# Patient Record
Sex: MALE | Race: WHITE | NOT HISPANIC OR LATINO | Employment: FULL TIME | URBAN - METROPOLITAN AREA
[De-identification: names, ages, dates, MRNs, and addresses within clinical notes are randomized per-mention and may not be internally consistent; named-entity substitution may affect disease eponyms.]

---

## 2023-11-09 ENCOUNTER — OFFICE VISIT (OUTPATIENT)
Dept: ORTHOPEDIC SURGERY | Facility: HOSPITAL | Age: 43
End: 2023-11-09
Payer: COMMERCIAL

## 2023-11-09 DIAGNOSIS — M75.41 IMPINGEMENT SYNDROME OF RIGHT SHOULDER: ICD-10-CM

## 2023-11-09 DIAGNOSIS — M75.21 BICIPITAL TENDINITIS OF RIGHT SHOULDER: Primary | ICD-10-CM

## 2023-11-09 DIAGNOSIS — M75.22 BICIPITAL TENDINITIS OF LEFT SHOULDER: ICD-10-CM

## 2023-11-09 PROCEDURE — 99213 OFFICE O/P EST LOW 20 MIN: CPT | Performed by: ORTHOPAEDIC SURGERY

## 2023-11-09 ASSESSMENT — ENCOUNTER SYMPTOMS
TROUBLE SWALLOWING: 0
CHILLS: 0
SHORTNESS OF BREATH: 0
RHINORRHEA: 0
WHEEZING: 0
WOUND: 0
FEVER: 0
FATIGUE: 0

## 2023-11-09 ASSESSMENT — PAIN SCALES - GENERAL: PAINLEVEL_OUTOF10: 4

## 2023-11-09 ASSESSMENT — PAIN - FUNCTIONAL ASSESSMENT: PAIN_FUNCTIONAL_ASSESSMENT: 0-10

## 2023-11-09 NOTE — PROGRESS NOTES
Reason for Appointment  B/l shoulder pain     History of Present Illness  Patient is a 43 y.o. male here today for a [facility] [place] case follow-up evaluation of recurrent bilateral shoulder pain.  He has recurrent bilateral shoulder pain.  He does a very good job as a heavy .  Over the last few months he has slowly had increased shoulder pain right greater than left.  Review his injections do give him relief for almost a year.  He would like to avoid any further intervention in terms of surgery and has done well with previous injections.  He has lateral and anterior right shoulder pain and anterior left shoulder pain.  No other changes in his past medical history, allergies, or medications.    History reviewed. No pertinent past medical history.    History reviewed. No pertinent surgical history.    Medication Documentation Review Audit       Reviewed by Kalpana Macias MA (Medical Assistant) on 11/09/23 at 0923      Medication Order Taking? Sig Documenting Provider Last Dose Status            No Medications to Display                                   No Known Allergies    Review of Systems   Constitutional:  Negative for chills, fatigue and fever.   HENT:  Negative for rhinorrhea and trouble swallowing.    Respiratory:  Negative for shortness of breath and wheezing.    Cardiovascular:  Negative for chest pain and leg swelling.   Skin:  Negative for rash and wound.     Exam   On exam right shoulder only shows about 110 degrees of active forward flexion, he has 130 degrees of active forward flexion on the left.  Maintained cuff strength bilaterally, markedly positive impingement signs on the right.  Tenderness anteriorly over bilateral biceps tendon sheaths and pain with open palm resistance tests.  Deltoids are functional bilaterally.  Good pulses and sensation in the upper extremities.    Assessment   Bilateral biceps tendinitis  Right shoulder impingement    Plan   We will place a C9 for bilateral biceps  tendon sheath injections and a right shoulder subacromial injection.  Have given him good relief for almost a year previously and we would like to avoid any further intervention such as surgery with the shoulders.    Written by Merline Weaver PA-C

## 2023-11-30 ENCOUNTER — OFFICE VISIT (OUTPATIENT)
Dept: ORTHOPEDIC SURGERY | Facility: HOSPITAL | Age: 43
End: 2023-11-30
Payer: COMMERCIAL

## 2023-11-30 DIAGNOSIS — M75.41 IMPINGEMENT SYNDROME OF RIGHT SHOULDER: ICD-10-CM

## 2023-11-30 DIAGNOSIS — M75.21 BICIPITAL TENDINITIS OF RIGHT SHOULDER: Primary | ICD-10-CM

## 2023-11-30 DIAGNOSIS — M75.22 BICIPITAL TENDINITIS OF LEFT SHOULDER: ICD-10-CM

## 2023-11-30 PROCEDURE — 20550 NJX 1 TENDON SHEATH/LIGAMENT: CPT | Performed by: ORTHOPAEDIC SURGERY

## 2023-11-30 PROCEDURE — 20611 DRAIN/INJ JOINT/BURSA W/US: CPT | Performed by: ORTHOPAEDIC SURGERY

## 2023-11-30 PROCEDURE — 76942 ECHO GUIDE FOR BIOPSY: CPT | Performed by: ORTHOPAEDIC SURGERY

## 2023-11-30 RX ORDER — TRIAMCINOLONE ACET/0.9%NACL/PF 40 MG/ML
40 VIAL (ML) INJECTION ONCE
Status: SHIPPED | OUTPATIENT
Start: 2023-11-30

## 2023-11-30 RX ORDER — METHYLPREDNISOLONE ACETATE 40 MG/ML
80 INJECTION, SUSPENSION INTRA-ARTICULAR; INTRALESIONAL; INTRAMUSCULAR; SOFT TISSUE ONCE
Status: SHIPPED | OUTPATIENT
Start: 2023-11-30

## 2023-11-30 NOTE — PROGRESS NOTES
Reason for Appointment  Chief Complaint   Patient presents with    Right Shoulder - Injections    Left Shoulder - Injections     History of Present Illness  This is a [facility] approved injection for bilateral biceps tendonitis and right shoulder bursitis. At this point, the patient is experiencing bilateral shoulder pain that is consistent with bilateral biceps tendonitis and right shoulder bursitis on clinical exam. We will do one cortisone injection to the bilateral biceps tendon sheaths and right subacromial space in hopes to calm their symptoms nicely. Pt understands the small risk of infection and warning signs including flare reaction. Follow-up in 4 weeks.     Assessment   Encounter Diagnoses   Name Primary?    Bicipital tendinitis of right shoulder Yes    Bicipital tendinitis of left shoulder     Impingement syndrome of right shoulder      Procedures  After discussing the risks and benefits of the procedure with proceeded with an injection.  Using ultrasound guidance we identified the acromion, humeral head and the subacromial bursa, images obtained. We then sterilely injected the right subacrominal space with a mixture of 40 mg of Kenalog and 1 cc of 1 % lidocaine. Pt tolerated the procedure well without any adverse reactions.  After discussing the risks and benefits of the procedure with proceeded with an injection. Using ultrasound guidance we identified the greater and lesser tuberosities and the biceps tendons sheath, images saved. We then sterilely injected the bilateral biceps tendon sheath with a mixture of 30 mg of Depo-Medrol and 1 cc of 1 % lidocaine. Pt tolerated the procedure well without any adverse reactions    Natalie DOMINGUEZ, attest that this documentation has been prepared under the direction and in the presence of Feliciano Echeverria MD. By signing below, Feliciano DOMINGUEZ MD, personally performed the services described in this documentation. All medical record entries made by the scribe were at  my direction and in my presence. I have reviewed the chart and agree that the record reflects my personal performance and is accurate and complete. 11/30/23

## 2024-01-11 ENCOUNTER — OFFICE VISIT (OUTPATIENT)
Dept: ORTHOPEDIC SURGERY | Facility: HOSPITAL | Age: 44
End: 2024-01-11
Payer: COMMERCIAL

## 2024-01-11 DIAGNOSIS — M75.41 IMPINGEMENT SYNDROME OF RIGHT SHOULDER: Primary | ICD-10-CM

## 2024-01-11 DIAGNOSIS — M75.21 BICIPITAL TENDINITIS OF RIGHT SHOULDER: ICD-10-CM

## 2024-01-11 PROCEDURE — 99213 OFFICE O/P EST LOW 20 MIN: CPT | Performed by: ORTHOPAEDIC SURGERY

## 2024-01-11 ASSESSMENT — ENCOUNTER SYMPTOMS
FATIGUE: 0
CHILLS: 0
WHEEZING: 0
FEVER: 0
COLOR CHANGE: 0
TROUBLE SWALLOWING: 0
SHORTNESS OF BREATH: 0

## 2024-01-11 ASSESSMENT — PAIN SCALES - GENERAL: PAINLEVEL_OUTOF10: 5 - MODERATE PAIN

## 2024-01-11 ASSESSMENT — PAIN - FUNCTIONAL ASSESSMENT: PAIN_FUNCTIONAL_ASSESSMENT: 0-10

## 2024-01-11 NOTE — PROGRESS NOTES
Reason for Appointment  Continued R shoulder pain    History of Present Illness  Patient is a 43 y.o. male here today for a [facility] case follow-up evaluation of continued right shoulder pain.  He had bilateral biceps and a right shoulder subacromial injection done 6 weeks ago.  The left shoulder is significantly better but he continues to have severe right shoulder pain.  He has had multiple sets of injections in the past and they have always helped the shoulders.  Now he has continued anterior shoulder pain, worse with lifting and overhead activity, he is losing motion in the shoulder and does a very active job.  No other changes in his past medical history, allergies, or medications.    History reviewed. No pertinent past medical history.    History reviewed. No pertinent surgical history.    Medication Documentation Review Audit       Reviewed by Kalpana Macias MA (Medical Assistant) on 01/11/24 at 0942      Medication Order Taking? Sig Documenting Provider Last Dose Status   methylPREDNISolone acetate (DEPO-Medrol) injection 80 mg [id number]   Feliciano Echeverria MD  Active   triamcinol ac (PF) in 0.9%NaCl (KENALOG) injection 40 mg [id number]   Feliciano Echeverria MD  Active                    No Known Allergies    Review of Systems   Constitutional:  Negative for chills, fatigue and fever.   HENT:  Negative for mouth sores and trouble swallowing.    Respiratory:  Negative for shortness of breath and wheezing.    Cardiovascular:  Negative for chest pain and leg swelling.   Skin:  Negative for color change and pallor.     Exam   On exam the right shoulder is lacking about 30 degrees of full active forward flexion compared to the left side.  He has some mild weakness with resisted external rotation on the right compared to the left side.  Deltoids are functional bilaterally, tenderness anteriorly over the right biceps tendon sheath and pain with an open palm resistance test.  Mildly positive impingement signs on the right.   Good pulses and sensation in the upper extremities.    Assessment   Right shoulder impingement  Right biceps tendinitis    Plan   With this failure of improvement after injections, an MRI of the right shoulder is warranted to evaluate for any progression of injury such as a rotator cuff tear or biceps tear.  He has had multiple sets of injections and has not had a recent MRI.  We will place a C9 for a right shoulder MRI and follow-up with him to discuss further options.    Written by Merline Weaver PA-C

## 2024-01-19 ENCOUNTER — TELEPHONE (OUTPATIENT)
Dept: ORTHOPEDIC SURGERY | Facility: HOSPITAL | Age: 44
End: 2024-01-19
Payer: COMMERCIAL

## 2024-01-19 DIAGNOSIS — M75.21 BICIPITAL TENDINITIS OF RIGHT SHOULDER: ICD-10-CM

## 2024-01-19 DIAGNOSIS — M75.41 IMPINGEMENT SYNDROME OF RIGHT SHOULDER: ICD-10-CM

## 2024-01-19 DIAGNOSIS — S43.431A TEAR OF RIGHT GLENOID LABRUM, INITIAL ENCOUNTER: ICD-10-CM

## 2024-01-23 ENCOUNTER — HOSPITAL ENCOUNTER (OUTPATIENT)
Dept: RADIOLOGY | Facility: HOSPITAL | Age: 44
Discharge: HOME | End: 2024-01-23
Payer: COMMERCIAL

## 2024-01-23 DIAGNOSIS — M75.21 BICIPITAL TENDINITIS OF RIGHT SHOULDER: ICD-10-CM

## 2024-01-23 DIAGNOSIS — S43.431A TEAR OF RIGHT GLENOID LABRUM, INITIAL ENCOUNTER: ICD-10-CM

## 2024-01-23 DIAGNOSIS — M75.41 IMPINGEMENT SYNDROME OF RIGHT SHOULDER: ICD-10-CM

## 2024-01-23 PROCEDURE — 73221 MRI JOINT UPR EXTREM W/O DYE: CPT | Mod: RT

## 2024-02-01 ENCOUNTER — OFFICE VISIT (OUTPATIENT)
Dept: ORTHOPEDIC SURGERY | Facility: HOSPITAL | Age: 44
End: 2024-02-01
Payer: COMMERCIAL

## 2024-02-01 DIAGNOSIS — M75.21 BICIPITAL TENDINITIS OF RIGHT SHOULDER: ICD-10-CM

## 2024-02-01 DIAGNOSIS — M75.41 IMPINGEMENT SYNDROME OF RIGHT SHOULDER: Primary | ICD-10-CM

## 2024-02-01 PROCEDURE — 99213 OFFICE O/P EST LOW 20 MIN: CPT | Performed by: ORTHOPAEDIC SURGERY

## 2024-02-01 ASSESSMENT — ENCOUNTER SYMPTOMS
TROUBLE SWALLOWING: 0
CHILLS: 0
FATIGUE: 0
SHORTNESS OF BREATH: 0
WHEEZING: 0
WOUND: 0
FEVER: 0

## 2024-02-01 ASSESSMENT — PAIN SCALES - GENERAL: PAINLEVEL_OUTOF10: 5 - MODERATE PAIN

## 2024-02-01 ASSESSMENT — PAIN - FUNCTIONAL ASSESSMENT: PAIN_FUNCTIONAL_ASSESSMENT: 0-10

## 2024-02-01 NOTE — PROGRESS NOTES
Reason for Appointment  Continued R shoulder pain    History of Present Illness  Patient is a 43 y.o. male here today for a [facility] case follow-up evaluation of continued severe right shoulder pain.  He has had multiple sets of injections that have given him good relief in the past, most recent set of injections helped him somewhat with lateral sided pain but he continues to have severe anterior shoulder pain worse with any lifting and activity.  He does do a very physical job.  Recent MRI is reviewed with the report and shows moderate grade partial-thickness tearing of the rotator cuff with tendinosis and biceps tendinosis.  No other changes in his past medical history, allergies, or medications.    History reviewed. No pertinent past medical history.    History reviewed. No pertinent surgical history.    Medication Documentation Review Audit       Reviewed by Merline Weaver PA-C (Physician Assistant) on 02/01/24 at 0901      Medication Order Taking? Sig Documenting Provider Last Dose Status   methylPREDNISolone acetate (DEPO-Medrol) injection 80 mg [id number]   Feliciano Echeverria MD  Active   triamcinol ac (PF) in 0.9%NaCl (KENALOG) injection 40 mg [id number]   Feliciano Echeverria MD  Active                    No Known Allergies    Review of Systems   Constitutional:  Negative for chills, fatigue and fever.   HENT:  Negative for postnasal drip and trouble swallowing.    Respiratory:  Negative for shortness of breath and wheezing.    Cardiovascular:  Negative for chest pain and leg swelling.   Skin:  Negative for rash and wound.     Exam   On exam he has pain with only active forward flexion up to about 100 degrees, passively I am able to get him up higher but with significant anterior shoulder pain.  Pain with an open palm resistance test and severe tenderness anteriorly over the biceps tendon sheath.  Positive impingement signs on the right but fairly good cuff strength with resisted external rotation.  Good pulses and  sensation in the upper extremity.    Assessment   Right shoulder impingement  Right biceps tendinitis    Plan   We had a long discussion with him today, he has had multiple sets of injections in the past that did give him good relief but he is now having severe symptoms that did not improve with injections.  Most of his symptoms are stemming from the biceps tendon sheath but he is having continued impingement symptoms as well.  He understands the risks of surge including nerve, artery, tendon damage, infection, continued pain, need for future surgery.  We will place a C9 for right shoulder arthroscopic decompression and biceps tenodesis    Written by Merline Weaver PA-C

## 2024-02-07 ENCOUNTER — TELEPHONE (OUTPATIENT)
Dept: ORTHOPEDIC SURGERY | Facility: CLINIC | Age: 44
End: 2024-02-07
Payer: COMMERCIAL

## 2024-02-07 NOTE — TELEPHONE ENCOUNTER
Patient has been approved for surgery. He was just seen last week 02/01/24. Does he need to come in again or can he just schedule surgery with Alanna?

## 2024-02-08 ENCOUNTER — PREP FOR PROCEDURE (OUTPATIENT)
Dept: ORTHOPEDIC SURGERY | Facility: HOSPITAL | Age: 44
End: 2024-02-08
Payer: COMMERCIAL

## 2024-02-08 DIAGNOSIS — M75.21 BICIPITAL TENDINITIS OF RIGHT SHOULDER: ICD-10-CM

## 2024-02-08 DIAGNOSIS — M75.41 IMPINGEMENT SYNDROME OF RIGHT SHOULDER: Primary | ICD-10-CM

## 2024-02-08 RX ORDER — SODIUM CHLORIDE, SODIUM LACTATE, POTASSIUM CHLORIDE, CALCIUM CHLORIDE 600; 310; 30; 20 MG/100ML; MG/100ML; MG/100ML; MG/100ML
100 INJECTION, SOLUTION INTRAVENOUS CONTINUOUS
Status: CANCELLED | OUTPATIENT
Start: 2024-02-08

## 2024-02-28 ENCOUNTER — APPOINTMENT (OUTPATIENT)
Dept: PREADMISSION TESTING | Facility: HOSPITAL | Age: 44
End: 2024-02-28
Payer: COMMERCIAL

## 2024-03-14 ENCOUNTER — APPOINTMENT (OUTPATIENT)
Dept: ORTHOPEDIC SURGERY | Facility: HOSPITAL | Age: 44
End: 2024-03-14
Payer: COMMERCIAL

## 2024-03-24 NOTE — H&P (VIEW-ONLY)
CPM/PAT Evaluation   Ashley Pedraza is a 44 y.o. male   Chief Complaint: I am having shoulder surgery    HPI:   Patient is a 43 y/o alert and oriented male coming in for PAT for a scheduled Repair Tendon w/ or w/o Tendon Graft Upper Extremity, Release Soft Tissue Upper Extremity on 4/3/24 w/ Dr Echeverria.    The patient reports he has 8/10 jolting shoulder pain with movement.  He states he is unable to raise his arm past 45 degrees.  He reports decreased rom.  He has intermittent numbness and tingling in his arm.  He has decreased hand strength.  Intermittent hand weakness.  He has had injections in the past but no physical therapy.  Patient denies chest pain, SOB, SERNA and NVDC.    Patient also denies Hx: DVT/PE.    Current medications were reviewed and a presurgical mediation schedule was provided.    He has no questions at this time.   Past Medical History:   Diagnosis Date    ADHD       Past Surgical History:   Procedure Laterality Date    NO PAST SURGERIES      [place] teeth extraction    No Known Allergies     Current Outpatient Medications on File Prior to Visit   Medication Sig Dispense Refill    amphetamine-dextroamphetamine (Adderall) 15 mg tablet Take 1 tablet (15 mg) by mouth every 12 hours.      naproxen sodium (Aleve) 220 mg tablet Take by mouth every 12 hours if needed.      tiZANidine (Zanaflex) 4 mg tablet Take 1 tablet (4 mg) by mouth as needed at bedtime.       Current Facility-Administered Medications on File Prior to Visit   Medication Dose Route Frequency Provider Last Rate Last Admin    methylPREDNISolone acetate (DEPO-Medrol) injection 80 mg  80 mg intra-articular Once Feliciano Echeverria MD        triamcinol ac (PF) in 0.9%NaCl (KENALOG) injection 40 mg  40 mg intra-articular Once Feliciano Echeverria MD           Review of Systems   Constitutional: Negative.    HENT: Negative.     Eyes: Negative.    Respiratory: Negative.     Cardiovascular: Negative.    Gastrointestinal: Negative.    Endocrine:  Negative.    Genitourinary: Negative.    Musculoskeletal:         See hpi for details   Allergic/Immunologic: Negative.    Neurological: Negative.    Hematological: Negative.    Psychiatric/Behavioral:          ADHD controlled with medication      Physical Exam  Vitals and nursing note reviewed.   Constitutional:       Appearance: Normal appearance.   HENT:      Head: Normocephalic and atraumatic.      Right Ear: Tympanic membrane normal.      Left Ear: Tympanic membrane normal.      Mouth/Throat:      Mouth: Mucous membranes are moist.      Pharynx: Oropharynx is clear.   Eyes:      Pupils: Pupils are equal, round, and reactive to light.   Cardiovascular:      Rate and Rhythm: Normal rate and regular rhythm.      Heart sounds: Normal heart sounds.   Pulmonary:      Effort: Pulmonary effort is normal.      Breath sounds: Normal breath sounds.   Abdominal:      General: Abdomen is flat. Bowel sounds are normal.      Palpations: Abdomen is soft.   Musculoskeletal:      Cervical back: Normal range of motion and neck supple.      Comments: Can only raise right arm to 45 degrees   Skin:     General: Skin is warm and dry.   Neurological:      General: No focal deficit present.      Mental Status: He is alert and oriented to person, place, and time.   Psychiatric:         Mood and Affect: Mood normal.         Behavior: Behavior normal.         Thought Content: Thought content normal.         Judgment: Judgment normal.        PAT AIRWAY:   Airway:     Mallampati::  II    TM distance::  >3 FB    Neck ROM::  Full  Has no dental issues  Ex smoker quit 2021 smoked 1/2 - 2 ppd x 28 years  Social alcohol use  No drug use - marijuana as a teenager  HAS NEVER HAD ANESTHESIA - just wisdom teeth  No family issues with anesthesia    Assessment and Plan:   Impingement Syndrome Right Shoulder, Biceps Tendinitis of Right Shoulder  Repair Tendon w/ or w/o Tendon Graft Upper Extremity, Release Soft Tissue Upper Extremity  Managed with  aleve 220mg     ADHD  Managed with Adderall 15mg daily    ASA II  RCRI - 0 points  Class I Risk 3.9%  DAHLIA - points Risk for FERNANDO   NSQIP - Predicted length of stay 0 days  ARISCAT - 3 points Low Risk 1.6%  DASI 36.7 Points 7.25 Mets  HARVINDER - 0.1%  JHFRAT - 3 points low  risk for falls  Clearance - not indicated  PAT Testing - CBC, BMP    Face to Face patient contact time 30 minutes    DEV Pryor-CNP 3/25/2024 9:37 AM  Results for orders placed or performed in visit on 03/25/24 (from the past 24 hour(s))   Basic Metabolic Panel   Result Value Ref Range    Glucose 88 74 - 99 mg/dL    Sodium 138 136 - 145 mmol/L    Potassium 3.7 3.5 - 5.3 mmol/L    Chloride 102 98 - 107 mmol/L    Bicarbonate 24 21 - 32 mmol/L    Anion Gap 16 10 - 20 mmol/L    Urea Nitrogen 26 (H) 6 - 23 mg/dL    Creatinine 1.46 (H) 0.50 - 1.30 mg/dL    eGFR 60 (L) >60 mL/min/1.73m*2    Calcium 9.5 8.6 - 10.3 mg/dL   CBC and Auto Differential   Result Value Ref Range    WBC 5.8 4.4 - 11.3 x10*3/uL    nRBC      RBC 4.97 4.50 - 5.90 x10*6/uL    Hemoglobin 11.5 (L) 13.5 - 17.5 g/dL    Hematocrit 35.9 (L) 41.0 - 52.0 %    MCV 72 (L) 80 - 100 fL    MCH 23.1 (L) 26.0 - 34.0 pg    MCHC 32.0 32.0 - 36.0 g/dL    RDW 14.9 (H) 11.5 - 14.5 %    Platelets 256 150 - 450 x10*3/uL    Neutrophils % 60.3 40.0 - 80.0 %    Immature Granulocytes %, Automated 0.2 0.0 - 0.9 %    Lymphocytes % 28.6 13.0 - 44.0 %    Monocytes % 9.4 2.0 - 10.0 %    Eosinophils % 1.2 0.0 - 6.0 %    Basophils % 0.3 0.0 - 2.0 %    Neutrophils Absolute 3.51 1.20 - 7.70 x10*3/uL    Immature Granulocytes Absolute, Automated 0.01 0.00 - 0.70 x10*3/uL    Lymphocytes Absolute 1.67 1.20 - 4.80 x10*3/uL    Monocytes Absolute 0.55 0.10 - 1.00 x10*3/uL    Eosinophils Absolute 0.07 0.00 - 0.70 x10*3/uL    Basophils Absolute 0.02 0.00 - 0.10 x10*3/uL

## 2024-03-24 NOTE — CPM/PAT H&P
CPM/PAT Evaluation   Ashley Pedraza is a 44 y.o. male   Chief Complaint: I am having shoulder surgery    HPI:   Patient is a 43 y/o alert and oriented male coming in for PAT for a scheduled Repair Tendon w/ or w/o Tendon Graft Upper Extremity, Release Soft Tissue Upper Extremity on 4/3/24 w/ Dr Echeverria.    The patient reports he has 8/10 jolting shoulder pain with movement.  He states he is unable to raise his arm past 45 degrees.  He reports decreased rom.  He has intermittent numbness and tingling in his arm.  He has decreased hand strength.  Intermittent hand weakness.  He has had injections in the past but no physical therapy.  Patient denies chest pain, SOB, SERNA and NVDC.    Patient also denies Hx: DVT/PE.    Current medications were reviewed and a presurgical mediation schedule was provided.    He has no questions at this time.   Past Medical History:   Diagnosis Date    ADHD       Past Surgical History:   Procedure Laterality Date    NO PAST SURGERIES      [place] teeth extraction    No Known Allergies     Current Outpatient Medications on File Prior to Visit   Medication Sig Dispense Refill    amphetamine-dextroamphetamine (Adderall) 15 mg tablet Take 1 tablet (15 mg) by mouth every 12 hours.      naproxen sodium (Aleve) 220 mg tablet Take by mouth every 12 hours if needed.      tiZANidine (Zanaflex) 4 mg tablet Take 1 tablet (4 mg) by mouth as needed at bedtime.       Current Facility-Administered Medications on File Prior to Visit   Medication Dose Route Frequency Provider Last Rate Last Admin    methylPREDNISolone acetate (DEPO-Medrol) injection 80 mg  80 mg intra-articular Once Feliciano Echeverria MD        triamcinol ac (PF) in 0.9%NaCl (KENALOG) injection 40 mg  40 mg intra-articular Once Feliciano Echeverria MD           Review of Systems   Constitutional: Negative.    HENT: Negative.     Eyes: Negative.    Respiratory: Negative.     Cardiovascular: Negative.    Gastrointestinal: Negative.    Endocrine:  Negative.    Genitourinary: Negative.    Musculoskeletal:         See hpi for details   Allergic/Immunologic: Negative.    Neurological: Negative.    Hematological: Negative.    Psychiatric/Behavioral:          ADHD controlled with medication      Physical Exam  Vitals and nursing note reviewed.   Constitutional:       Appearance: Normal appearance.   HENT:      Head: Normocephalic and atraumatic.      Right Ear: Tympanic membrane normal.      Left Ear: Tympanic membrane normal.      Mouth/Throat:      Mouth: Mucous membranes are moist.      Pharynx: Oropharynx is clear.   Eyes:      Pupils: Pupils are equal, round, and reactive to light.   Cardiovascular:      Rate and Rhythm: Normal rate and regular rhythm.      Heart sounds: Normal heart sounds.   Pulmonary:      Effort: Pulmonary effort is normal.      Breath sounds: Normal breath sounds.   Abdominal:      General: Abdomen is flat. Bowel sounds are normal.      Palpations: Abdomen is soft.   Musculoskeletal:      Cervical back: Normal range of motion and neck supple.      Comments: Can only raise right arm to 45 degrees   Skin:     General: Skin is warm and dry.   Neurological:      General: No focal deficit present.      Mental Status: He is alert and oriented to person, place, and time.   Psychiatric:         Mood and Affect: Mood normal.         Behavior: Behavior normal.         Thought Content: Thought content normal.         Judgment: Judgment normal.        PAT AIRWAY:   Airway:     Mallampati::  II    TM distance::  >3 FB    Neck ROM::  Full  Has no dental issues  Ex smoker quit 2021 smoked 1/2 - 2 ppd x 28 years  Social alcohol use  No drug use - marijuana as a teenager  HAS NEVER HAD ANESTHESIA - just wisdom teeth  No family issues with anesthesia    Assessment and Plan:   Impingement Syndrome Right Shoulder, Biceps Tendinitis of Right Shoulder  Repair Tendon w/ or w/o Tendon Graft Upper Extremity, Release Soft Tissue Upper Extremity  Managed with  aleve 220mg     ADHD  Managed with Adderall 15mg daily    ASA II  RCRI - 0 points  Class I Risk 3.9%  DAHLIA - points Risk for FERNANDO   NSQIP - Predicted length of stay 0 days  ARISCAT - 3 points Low Risk 1.6%  DASI 36.7 Points 7.25 Mets  HARVINDER - 0.1%  JHFRAT - 3 points low  risk for falls  Clearance - not indicated  PAT Testing - CBC, BMP    Face to Face patient contact time 30 minutes    DEV Pryor-CNP 3/25/2024 9:37 AM  Results for orders placed or performed in visit on 03/25/24 (from the past 24 hour(s))   Basic Metabolic Panel   Result Value Ref Range    Glucose 88 74 - 99 mg/dL    Sodium 138 136 - 145 mmol/L    Potassium 3.7 3.5 - 5.3 mmol/L    Chloride 102 98 - 107 mmol/L    Bicarbonate 24 21 - 32 mmol/L    Anion Gap 16 10 - 20 mmol/L    Urea Nitrogen 26 (H) 6 - 23 mg/dL    Creatinine 1.46 (H) 0.50 - 1.30 mg/dL    eGFR 60 (L) >60 mL/min/1.73m*2    Calcium 9.5 8.6 - 10.3 mg/dL   CBC and Auto Differential   Result Value Ref Range    WBC 5.8 4.4 - 11.3 x10*3/uL    nRBC      RBC 4.97 4.50 - 5.90 x10*6/uL    Hemoglobin 11.5 (L) 13.5 - 17.5 g/dL    Hematocrit 35.9 (L) 41.0 - 52.0 %    MCV 72 (L) 80 - 100 fL    MCH 23.1 (L) 26.0 - 34.0 pg    MCHC 32.0 32.0 - 36.0 g/dL    RDW 14.9 (H) 11.5 - 14.5 %    Platelets 256 150 - 450 x10*3/uL    Neutrophils % 60.3 40.0 - 80.0 %    Immature Granulocytes %, Automated 0.2 0.0 - 0.9 %    Lymphocytes % 28.6 13.0 - 44.0 %    Monocytes % 9.4 2.0 - 10.0 %    Eosinophils % 1.2 0.0 - 6.0 %    Basophils % 0.3 0.0 - 2.0 %    Neutrophils Absolute 3.51 1.20 - 7.70 x10*3/uL    Immature Granulocytes Absolute, Automated 0.01 0.00 - 0.70 x10*3/uL    Lymphocytes Absolute 1.67 1.20 - 4.80 x10*3/uL    Monocytes Absolute 0.55 0.10 - 1.00 x10*3/uL    Eosinophils Absolute 0.07 0.00 - 0.70 x10*3/uL    Basophils Absolute 0.02 0.00 - 0.10 x10*3/uL

## 2024-03-25 ENCOUNTER — APPOINTMENT (OUTPATIENT)
Dept: PREADMISSION TESTING | Facility: HOSPITAL | Age: 44
End: 2024-03-25

## 2024-03-25 ENCOUNTER — PRE-ADMISSION TESTING (OUTPATIENT)
Dept: PREADMISSION TESTING | Facility: HOSPITAL | Age: 44
End: 2024-03-25
Payer: COMMERCIAL

## 2024-03-25 VITALS
BODY MASS INDEX: 26.01 KG/M2 | WEIGHT: 175.6 LBS | OXYGEN SATURATION: 99 % | DIASTOLIC BLOOD PRESSURE: 86 MMHG | SYSTOLIC BLOOD PRESSURE: 135 MMHG | HEART RATE: 82 BPM | TEMPERATURE: 98.5 F | RESPIRATION RATE: 12 BRPM | HEIGHT: 69 IN

## 2024-03-25 DIAGNOSIS — Z01.818 PREOPERATIVE TESTING: Primary | ICD-10-CM

## 2024-03-25 DIAGNOSIS — M75.41 IMPINGEMENT SYNDROME OF RIGHT SHOULDER: ICD-10-CM

## 2024-03-25 DIAGNOSIS — M75.21 BICIPITAL TENDINITIS OF RIGHT SHOULDER: ICD-10-CM

## 2024-03-25 LAB
ANION GAP SERPL CALC-SCNC: 16 MMOL/L (ref 10–20)
BASOPHILS # BLD AUTO: 0.02 X10*3/UL (ref 0–0.1)
BASOPHILS NFR BLD AUTO: 0.3 %
BUN SERPL-MCNC: 26 MG/DL (ref 6–23)
CALCIUM SERPL-MCNC: 9.5 MG/DL (ref 8.6–10.3)
CHLORIDE SERPL-SCNC: 102 MMOL/L (ref 98–107)
CO2 SERPL-SCNC: 24 MMOL/L (ref 21–32)
CREAT SERPL-MCNC: 1.46 MG/DL (ref 0.5–1.3)
EGFRCR SERPLBLD CKD-EPI 2021: 60 ML/MIN/1.73M*2
EOSINOPHIL # BLD AUTO: 0.07 X10*3/UL (ref 0–0.7)
EOSINOPHIL NFR BLD AUTO: 1.2 %
ERYTHROCYTE [DISTWIDTH] IN BLOOD BY AUTOMATED COUNT: 14.9 % (ref 11.5–14.5)
GLUCOSE SERPL-MCNC: 88 MG/DL (ref 74–99)
HCT VFR BLD AUTO: 35.9 % (ref 41–52)
HGB BLD-MCNC: 11.5 G/DL (ref 13.5–17.5)
IMM GRANULOCYTES # BLD AUTO: 0.01 X10*3/UL (ref 0–0.7)
IMM GRANULOCYTES NFR BLD AUTO: 0.2 % (ref 0–0.9)
LYMPHOCYTES # BLD AUTO: 1.67 X10*3/UL (ref 1.2–4.8)
LYMPHOCYTES NFR BLD AUTO: 28.6 %
MCH RBC QN AUTO: 23.1 PG (ref 26–34)
MCHC RBC AUTO-ENTMCNC: 32 G/DL (ref 32–36)
MCV RBC AUTO: 72 FL (ref 80–100)
MONOCYTES # BLD AUTO: 0.55 X10*3/UL (ref 0.1–1)
MONOCYTES NFR BLD AUTO: 9.4 %
NEUTROPHILS # BLD AUTO: 3.51 X10*3/UL (ref 1.2–7.7)
NEUTROPHILS NFR BLD AUTO: 60.3 %
NRBC BLD-RTO: ABNORMAL /100{WBCS}
PLATELET # BLD AUTO: 256 X10*3/UL (ref 150–450)
POTASSIUM SERPL-SCNC: 3.7 MMOL/L (ref 3.5–5.3)
RBC # BLD AUTO: 4.97 X10*6/UL (ref 4.5–5.9)
SODIUM SERPL-SCNC: 138 MMOL/L (ref 136–145)
WBC # BLD AUTO: 5.8 X10*3/UL (ref 4.4–11.3)

## 2024-03-25 PROCEDURE — 36415 COLL VENOUS BLD VENIPUNCTURE: CPT

## 2024-03-25 PROCEDURE — 99203 OFFICE O/P NEW LOW 30 MIN: CPT | Performed by: NURSE PRACTITIONER

## 2024-03-25 PROCEDURE — 80048 BASIC METABOLIC PNL TOTAL CA: CPT

## 2024-03-25 PROCEDURE — 85025 COMPLETE CBC W/AUTO DIFF WBC: CPT

## 2024-03-25 RX ORDER — TIZANIDINE 4 MG/1
4 TABLET ORAL NIGHTLY PRN
COMMUNITY

## 2024-03-25 RX ORDER — NAPROXEN SODIUM 220 MG
TABLET ORAL EVERY 12 HOURS PRN
COMMUNITY

## 2024-03-25 RX ORDER — DEXTROAMPHETAMINE SACCHARATE, AMPHETAMINE ASPARTATE, DEXTROAMPHETAMINE SULFATE AND AMPHETAMINE SULFATE 3.75; 3.75; 3.75; 3.75 MG/1; MG/1; MG/1; MG/1
15 TABLET ORAL EVERY 12 HOURS
COMMUNITY
Start: 2023-10-06

## 2024-03-25 ASSESSMENT — ENCOUNTER SYMPTOMS
GASTROINTESTINAL NEGATIVE: 1
EYES NEGATIVE: 1
ALLERGIC/IMMUNOLOGIC NEGATIVE: 1
HEMATOLOGIC/LYMPHATIC NEGATIVE: 1
CONSTITUTIONAL NEGATIVE: 1
RESPIRATORY NEGATIVE: 1
CARDIOVASCULAR NEGATIVE: 1
NEUROLOGICAL NEGATIVE: 1
ENDOCRINE NEGATIVE: 1

## 2024-03-25 ASSESSMENT — PAIN SCALES - GENERAL: PAINLEVEL_OUTOF10: 8

## 2024-03-25 ASSESSMENT — PAIN DESCRIPTION - DESCRIPTORS: DESCRIPTORS: STABBING

## 2024-03-25 ASSESSMENT — PAIN - FUNCTIONAL ASSESSMENT: PAIN_FUNCTIONAL_ASSESSMENT: 0-10

## 2024-03-25 NOTE — PREPROCEDURE INSTRUCTIONS
Medication List            Accurate as of March 25, 2024  9:08 AM. Always use your most recent med list.                Aleve 220 mg tablet  Generic drug: naproxen sodium  Medication Adjustments for Surgery: Stop 7 days before surgery     amphetamine-dextroamphetamine 15 mg tablet  Commonly known as: Adderall  Medication Adjustments for Surgery: Stop 1 day before surgery     tiZANidine 4 mg tablet  Commonly known as: Zanaflex  Medication Adjustments for Surgery: Continue until night before surgery                              NPO Instructions:    Do not eat any food after midnight the night before your surgery/procedure.    Additional Instructions:     Seven/Six Days before Surgery:  Review your medication instructions, stop indicated medications  Five Days before Surgery:  Review your medication instructions, stop indicated medications  Three Days before Surgery:  Review your medication instructions, stop indicated medications  The Day before Surgery:  Review your medication instructions, stop indicated medications  You will be contacted regarding the time of your arrival to facility and surgery time  Do not eat any food after Midnight  Day of Surgery:  Review your medication instructions, take indicated medications  Wear  comfortable loose fitting clothing  Do not use moisturizers, creams, lotions or perfume  All jewelry and valuables should be left at home  PAT DISCHARGE INSTRUCTIONS    Please call the Same Day Surgery (SDS) Department of the hospital where your procedure will be performed between 2:00- 3:30 PM the day before your surgery. If you are scheduled on a Monday, or a Tuesday following a Monday holiday, you will need to call on the last business day prior to your surgery.    [facility]  [address] [place] [address]  [phone]    Please let your surgeon know if:      You develop any open sores, shingles, burning or painful urination as these may  increase your risk of an infection.   You no longer wish to have the surgery.   Any other personal circumstances change that may lead to the need to cancel or defer this surgery-such as being sick or getting admitted to any hospital within one week of your planned procedure.    Your contact details change, such as a change of address or phone number.    Starting now:     Please DO NOT drink alcohol or smoke for 24 hours before surgery. It is well known that quitting smoking can make a huge difference to your health and recovery from surgery. The longer you abstain from smoking, the better your chances of a healthy recovery. If you need help with quitting, call 1-800QUIT-NOW to be connected to a trained counselor who will discuss the best methods to help you quit.     Before your surgery:    Please stop all supplements 7 days prior to surgery. Or as directed by your surgeon.   Please stop taking NSAID pain medicine such as Advil and Motrin 7 days before surgery.    If you develop any fever, cough, cold, rashes, cuts, scratches, scrapes, urinary symptoms or infection anywhere on your body (including teeth and gums) prior to surgery, please call your surgeon’s office as soon as possible. This may require treatment to reduce the chance of cancellation on the day of surgery.    The day before your surgery:   DIET- Do not eat any food after MIDNIGHT.   Get a good night’s rest.  Use the special soap for bathing if you have been instructed to use one.    Scheduled surgery times may change and you will be notified if this occurs - please check your personal voicemail for any updates.     On the morning of surgery:   Wear comfortable, loose fitting clothes which open in the front. Please do not wear moisturizers, creams, lotions, makeup or perfume.    Please bring with you to surgery:   Photo ID and insurance card   Current list of medicines and allergies   Pacemaker/ Defibrillator/Heart stent cards   CPAP machine and mask     Slings/ splints/ crutches   A copy of your complete advanced directive/DHPOA.    Please do NOT bring with you to surgery:   All jewelry and valuables should be left at home.   Prosthetic devices such as contact lenses, hearing aids, dentures, eyelash extensions, hairpins and body piercings must be removed prior to going in to the surgical suite.    After outpatient surgery:   A responsible adult MUST accompany you at the time of discharge and stay with you for 24 hours after your surgery. You may NOT drive yourself home after surgery.    Do not drive, operate machinery, make critical decisions or do activities that require co-ordination or balance until after a night’s sleep.   Do not drink alcoholic beverages for 24 hours.   Instructions for resuming your medications will be provided by your surgeon.    CALL YOUR DOCTOR AFTER SURGERY IF YOU HAVE:     Chills and/or a fever of 101° F or higher.    Redness, swelling, pus or drainage from your surgical wound or a bad smell from the wound.    Lightheadedness, fainting or confusion.    Persistent vomiting (throwing up) and are not able to eat or drink for 12 hours.    Three or more loose, watery bowel movements in 24 hours (diarrhea).   Difficulty or pain while urinating( after non-urological surgery)    Pain and swelling in your legs, especially if it is only on one side.    Difficulty breathing or are breathing faster than normal.    Any new concerning symptoms.      Reviewed pre-op instructions with patient, states understanding and denies further questions at this time.    If you have not received a call regarding your arrival time for surgery by 2pm on the day before surgery, you can call [phone].    Take Care

## 2024-04-03 ENCOUNTER — ANESTHESIA EVENT (OUTPATIENT)
Dept: OPERATING ROOM | Facility: HOSPITAL | Age: 44
End: 2024-04-03
Payer: COMMERCIAL

## 2024-04-03 ENCOUNTER — HOSPITAL ENCOUNTER (OUTPATIENT)
Facility: HOSPITAL | Age: 44
Setting detail: OUTPATIENT SURGERY
Discharge: HOME | End: 2024-04-03
Attending: ORTHOPAEDIC SURGERY | Admitting: ORTHOPAEDIC SURGERY
Payer: COMMERCIAL

## 2024-04-03 ENCOUNTER — ANESTHESIA (OUTPATIENT)
Dept: OPERATING ROOM | Facility: HOSPITAL | Age: 44
End: 2024-04-03
Payer: COMMERCIAL

## 2024-04-03 VITALS
SYSTOLIC BLOOD PRESSURE: 128 MMHG | OXYGEN SATURATION: 97 % | WEIGHT: 175.6 LBS | RESPIRATION RATE: 18 BRPM | TEMPERATURE: 97.7 F | HEART RATE: 71 BPM | BODY MASS INDEX: 26.01 KG/M2 | HEIGHT: 69 IN | DIASTOLIC BLOOD PRESSURE: 86 MMHG

## 2024-04-03 DIAGNOSIS — M75.41 IMPINGEMENT SYNDROME OF RIGHT SHOULDER: Primary | ICD-10-CM

## 2024-04-03 DIAGNOSIS — M75.21 BICIPITAL TENDINITIS OF RIGHT SHOULDER: ICD-10-CM

## 2024-04-03 PROCEDURE — 2500000004 HC RX 250 GENERAL PHARMACY W/ HCPCS (ALT 636 FOR OP/ED): Performed by: ORTHOPAEDIC SURGERY

## 2024-04-03 PROCEDURE — 7100000002 HC RECOVERY ROOM TIME - EACH INCREMENTAL 1 MINUTE: Performed by: ORTHOPAEDIC SURGERY

## 2024-04-03 PROCEDURE — C1713 ANCHOR/SCREW BN/BN,TIS/BN: HCPCS | Performed by: ORTHOPAEDIC SURGERY

## 2024-04-03 PROCEDURE — 7100000009 HC PHASE TWO TIME - INITIAL BASE CHARGE: Performed by: ORTHOPAEDIC SURGERY

## 2024-04-03 PROCEDURE — A23430 PR REPAIR BICEPS LONG TENDON: Performed by: ANESTHESIOLOGY

## 2024-04-03 PROCEDURE — 29823 SHO ARTHRS SRG XTNSV DBRDMT: CPT | Performed by: PHYSICIAN ASSISTANT

## 2024-04-03 PROCEDURE — 23430 REPAIR BICEPS TENDON: CPT | Performed by: ORTHOPAEDIC SURGERY

## 2024-04-03 PROCEDURE — 2500000005 HC RX 250 GENERAL PHARMACY W/O HCPCS: Performed by: ANESTHESIOLOGY

## 2024-04-03 PROCEDURE — 64415 NJX AA&/STRD BRCH PLXS IMG: CPT | Performed by: ANESTHESIOLOGY

## 2024-04-03 PROCEDURE — 2780000003 HC OR 278 NO HCPCS: Performed by: ORTHOPAEDIC SURGERY

## 2024-04-03 PROCEDURE — 2500000005 HC RX 250 GENERAL PHARMACY W/O HCPCS: Performed by: ANESTHESIOLOGIST ASSISTANT

## 2024-04-03 PROCEDURE — 3700000001 HC GENERAL ANESTHESIA TIME - INITIAL BASE CHARGE: Performed by: ORTHOPAEDIC SURGERY

## 2024-04-03 PROCEDURE — 3600000008 HC OR TIME - EACH INCREMENTAL 1 MINUTE - PROCEDURE LEVEL THREE: Performed by: ORTHOPAEDIC SURGERY

## 2024-04-03 PROCEDURE — 3600000003 HC OR TIME - INITIAL BASE CHARGE - PROCEDURE LEVEL THREE: Performed by: ORTHOPAEDIC SURGERY

## 2024-04-03 PROCEDURE — 7100000010 HC PHASE TWO TIME - EACH INCREMENTAL 1 MINUTE: Performed by: ORTHOPAEDIC SURGERY

## 2024-04-03 PROCEDURE — 2500000004 HC RX 250 GENERAL PHARMACY W/ HCPCS (ALT 636 FOR OP/ED): Performed by: PHYSICIAN ASSISTANT

## 2024-04-03 PROCEDURE — 2720000007 HC OR 272 NO HCPCS: Performed by: ORTHOPAEDIC SURGERY

## 2024-04-03 PROCEDURE — A23430 PR REPAIR BICEPS LONG TENDON: Performed by: ANESTHESIOLOGIST ASSISTANT

## 2024-04-03 PROCEDURE — 29823 SHO ARTHRS SRG XTNSV DBRDMT: CPT | Performed by: ORTHOPAEDIC SURGERY

## 2024-04-03 PROCEDURE — 29826 SHO ARTHRS SRG DECOMPRESSION: CPT | Performed by: ORTHOPAEDIC SURGERY

## 2024-04-03 PROCEDURE — A4217 STERILE WATER/SALINE, 500 ML: HCPCS | Performed by: ORTHOPAEDIC SURGERY

## 2024-04-03 PROCEDURE — 7100000001 HC RECOVERY ROOM TIME - INITIAL BASE CHARGE: Performed by: ORTHOPAEDIC SURGERY

## 2024-04-03 PROCEDURE — 2500000004 HC RX 250 GENERAL PHARMACY W/ HCPCS (ALT 636 FOR OP/ED): Performed by: ANESTHESIOLOGY

## 2024-04-03 PROCEDURE — 3700000002 HC GENERAL ANESTHESIA TIME - EACH INCREMENTAL 1 MINUTE: Performed by: ORTHOPAEDIC SURGERY

## 2024-04-03 PROCEDURE — 23430 REPAIR BICEPS TENDON: CPT | Performed by: PHYSICIAN ASSISTANT

## 2024-04-03 PROCEDURE — 29826 SHO ARTHRS SRG DECOMPRESSION: CPT | Performed by: PHYSICIAN ASSISTANT

## 2024-04-03 PROCEDURE — 2500000004 HC RX 250 GENERAL PHARMACY W/ HCPCS (ALT 636 FOR OP/ED): Performed by: ANESTHESIOLOGIST ASSISTANT

## 2024-04-03 PROCEDURE — A4649 SURGICAL SUPPLIES: HCPCS | Performed by: ORTHOPAEDIC SURGERY

## 2024-04-03 DEVICE — PROXIMAL TENODESIS IMPLANT SYSTEM REV: 0
Type: IMPLANTABLE DEVICE | Site: ARM | Status: FUNCTIONAL
Brand: ARTHREX®

## 2024-04-03 RX ORDER — ONDANSETRON HYDROCHLORIDE 2 MG/ML
INJECTION, SOLUTION INTRAVENOUS AS NEEDED
Status: DISCONTINUED | OUTPATIENT
Start: 2024-04-03 | End: 2024-04-03

## 2024-04-03 RX ORDER — MIDAZOLAM HYDROCHLORIDE 1 MG/ML
2 INJECTION, SOLUTION INTRAMUSCULAR; INTRAVENOUS ONCE
Status: COMPLETED | OUTPATIENT
Start: 2024-04-03 | End: 2024-04-03

## 2024-04-03 RX ORDER — FENTANYL CITRATE 50 UG/ML
50 INJECTION, SOLUTION INTRAMUSCULAR; INTRAVENOUS EVERY 5 MIN PRN
Status: DISCONTINUED | OUTPATIENT
Start: 2024-04-03 | End: 2024-04-03 | Stop reason: HOSPADM

## 2024-04-03 RX ORDER — MEPERIDINE HYDROCHLORIDE 25 MG/ML
12.5 INJECTION INTRAMUSCULAR; INTRAVENOUS; SUBCUTANEOUS EVERY 10 MIN PRN
Status: DISCONTINUED | OUTPATIENT
Start: 2024-04-03 | End: 2024-04-03 | Stop reason: HOSPADM

## 2024-04-03 RX ORDER — FENTANYL CITRATE 50 UG/ML
100 INJECTION, SOLUTION INTRAMUSCULAR; INTRAVENOUS ONCE
Status: COMPLETED | OUTPATIENT
Start: 2024-04-03 | End: 2024-04-03

## 2024-04-03 RX ORDER — NEOSTIGMINE METHYLSULFATE 1 MG/ML
INJECTION, SOLUTION INTRAVENOUS AS NEEDED
Status: DISCONTINUED | OUTPATIENT
Start: 2024-04-03 | End: 2024-04-03

## 2024-04-03 RX ORDER — SODIUM CHLORIDE, SODIUM LACTATE, POTASSIUM CHLORIDE, CALCIUM CHLORIDE 600; 310; 30; 20 MG/100ML; MG/100ML; MG/100ML; MG/100ML
100 INJECTION, SOLUTION INTRAVENOUS CONTINUOUS
Status: DISCONTINUED | OUTPATIENT
Start: 2024-04-03 | End: 2024-04-03 | Stop reason: HOSPADM

## 2024-04-03 RX ORDER — DEXAMETHASONE SODIUM PHOSPHATE 10 MG/ML
INJECTION INTRAMUSCULAR; INTRAVENOUS AS NEEDED
Status: DISCONTINUED | OUTPATIENT
Start: 2024-04-03 | End: 2024-04-03

## 2024-04-03 RX ORDER — FENTANYL CITRATE 50 UG/ML
INJECTION, SOLUTION INTRAMUSCULAR; INTRAVENOUS AS NEEDED
Status: DISCONTINUED | OUTPATIENT
Start: 2024-04-03 | End: 2024-04-03

## 2024-04-03 RX ORDER — LABETALOL HYDROCHLORIDE 5 MG/ML
5 INJECTION, SOLUTION INTRAVENOUS ONCE AS NEEDED
Status: DISCONTINUED | OUTPATIENT
Start: 2024-04-03 | End: 2024-04-03 | Stop reason: HOSPADM

## 2024-04-03 RX ORDER — PROPOFOL 10 MG/ML
INJECTION, EMULSION INTRAVENOUS AS NEEDED
Status: DISCONTINUED | OUTPATIENT
Start: 2024-04-03 | End: 2024-04-03

## 2024-04-03 RX ORDER — BUPIVACAINE HYDROCHLORIDE 5 MG/ML
INJECTION, SOLUTION PERINEURAL AS NEEDED
Status: DISCONTINUED | OUTPATIENT
Start: 2024-04-03 | End: 2024-04-03

## 2024-04-03 RX ORDER — LIDOCAINE HYDROCHLORIDE 10 MG/ML
INJECTION, SOLUTION EPIDURAL; INFILTRATION; INTRACAUDAL; PERINEURAL AS NEEDED
Status: DISCONTINUED | OUTPATIENT
Start: 2024-04-03 | End: 2024-04-03

## 2024-04-03 RX ORDER — CEFAZOLIN SODIUM 2 G/100ML
2 INJECTION, SOLUTION INTRAVENOUS ONCE
Status: COMPLETED | OUTPATIENT
Start: 2024-04-03 | End: 2024-04-03

## 2024-04-03 RX ORDER — OXYCODONE AND ACETAMINOPHEN 5; 325 MG/1; MG/1
1 TABLET ORAL EVERY 6 HOURS PRN
Qty: 20 TABLET | Refills: 0 | Status: SHIPPED | OUTPATIENT
Start: 2024-04-03 | End: 2024-04-08

## 2024-04-03 RX ORDER — GLYCOPYRROLATE 0.2 MG/ML
INJECTION INTRAMUSCULAR; INTRAVENOUS AS NEEDED
Status: DISCONTINUED | OUTPATIENT
Start: 2024-04-03 | End: 2024-04-03

## 2024-04-03 RX ORDER — ALBUTEROL SULFATE 0.83 MG/ML
2.5 SOLUTION RESPIRATORY (INHALATION) ONCE AS NEEDED
Status: DISCONTINUED | OUTPATIENT
Start: 2024-04-03 | End: 2024-04-03 | Stop reason: HOSPADM

## 2024-04-03 RX ORDER — HYDRALAZINE HYDROCHLORIDE 20 MG/ML
5 INJECTION INTRAMUSCULAR; INTRAVENOUS EVERY 30 MIN PRN
Status: DISCONTINUED | OUTPATIENT
Start: 2024-04-03 | End: 2024-04-03 | Stop reason: HOSPADM

## 2024-04-03 RX ORDER — ROCURONIUM BROMIDE 10 MG/ML
INJECTION, SOLUTION INTRAVENOUS AS NEEDED
Status: DISCONTINUED | OUTPATIENT
Start: 2024-04-03 | End: 2024-04-03

## 2024-04-03 RX ORDER — ONDANSETRON HYDROCHLORIDE 2 MG/ML
4 INJECTION, SOLUTION INTRAVENOUS ONCE AS NEEDED
Status: DISCONTINUED | OUTPATIENT
Start: 2024-04-03 | End: 2024-04-03 | Stop reason: HOSPADM

## 2024-04-03 RX ADMIN — NEOSTIGMINE METHYLSULFATE 2 MG: 1 INJECTION INTRAVENOUS at 13:30

## 2024-04-03 RX ADMIN — FENTANYL CITRATE 50 MCG: 50 INJECTION INTRAMUSCULAR; INTRAVENOUS at 13:23

## 2024-04-03 RX ADMIN — PROPOFOL 200 MG: 10 INJECTION, EMULSION INTRAVENOUS at 12:38

## 2024-04-03 RX ADMIN — LIDOCAINE HYDROCHLORIDE 50 MG: 10 INJECTION, SOLUTION EPIDURAL; INFILTRATION; INTRACAUDAL; PERINEURAL at 12:38

## 2024-04-03 RX ADMIN — SODIUM CHLORIDE, SODIUM LACTATE, POTASSIUM CHLORIDE, AND CALCIUM CHLORIDE 100 ML/HR: 600; 310; 30; 20 INJECTION, SOLUTION INTRAVENOUS at 11:02

## 2024-04-03 RX ADMIN — BUPIVACAINE HYDROCHLORIDE 20 ML: 5 INJECTION, SOLUTION PERINEURAL at 11:33

## 2024-04-03 RX ADMIN — FENTANYL CITRATE 50 MCG: 50 INJECTION INTRAMUSCULAR; INTRAVENOUS at 12:38

## 2024-04-03 RX ADMIN — DEXAMETHASONE SODIUM PHOSPHATE 10 MG: 10 INJECTION, SOLUTION INTRAMUSCULAR; INTRAVENOUS at 11:33

## 2024-04-03 RX ADMIN — FENTANYL CITRATE 100 MCG: 50 INJECTION INTRAMUSCULAR; INTRAVENOUS at 11:30

## 2024-04-03 RX ADMIN — MIDAZOLAM HYDROCHLORIDE 2 MG: 1 INJECTION, SOLUTION INTRAMUSCULAR; INTRAVENOUS at 11:29

## 2024-04-03 RX ADMIN — CEFAZOLIN SODIUM 2 G: 2 INJECTION, SOLUTION INTRAVENOUS at 12:45

## 2024-04-03 RX ADMIN — ONDANSETRON 4 MG: 2 INJECTION INTRAMUSCULAR; INTRAVENOUS at 13:23

## 2024-04-03 RX ADMIN — GLYCOPYRROLATE 0.4 MG: 0.2 INJECTION INTRAMUSCULAR; INTRAVENOUS at 13:30

## 2024-04-03 RX ADMIN — SODIUM CHLORIDE, SODIUM LACTATE, POTASSIUM CHLORIDE, AND CALCIUM CHLORIDE: 600; 310; 30; 20 INJECTION, SOLUTION INTRAVENOUS at 13:38

## 2024-04-03 RX ADMIN — ROCURONIUM BROMIDE 30 MG: 10 INJECTION, SOLUTION INTRAVENOUS at 12:38

## 2024-04-03 RX ADMIN — DEXAMETHASONE SODIUM PHOSPHATE 4 MG: 4 INJECTION, SOLUTION INTRAMUSCULAR; INTRAVENOUS at 12:55

## 2024-04-03 SDOH — HEALTH STABILITY: MENTAL HEALTH: CURRENT SMOKER: 0

## 2024-04-03 ASSESSMENT — PAIN SCALES - GENERAL
PAIN_LEVEL: 3
PAINLEVEL_OUTOF10: 8
PAINLEVEL_OUTOF10: 0 - NO PAIN
PAINLEVEL_OUTOF10: 1
PAINLEVEL_OUTOF10: 0 - NO PAIN

## 2024-04-03 ASSESSMENT — PAIN - FUNCTIONAL ASSESSMENT
PAIN_FUNCTIONAL_ASSESSMENT: 0-10
PAIN_FUNCTIONAL_ASSESSMENT: WONG-BAKER FACES
PAIN_FUNCTIONAL_ASSESSMENT: 0-10

## 2024-04-03 ASSESSMENT — COLUMBIA-SUICIDE SEVERITY RATING SCALE - C-SSRS
2. HAVE YOU ACTUALLY HAD ANY THOUGHTS OF KILLING YOURSELF?: NO
1. IN THE PAST MONTH, HAVE YOU WISHED YOU WERE DEAD OR WISHED YOU COULD GO TO SLEEP AND NOT WAKE UP?: NO
6. HAVE YOU EVER DONE ANYTHING, STARTED TO DO ANYTHING, OR PREPARED TO DO ANYTHING TO END YOUR LIFE?: NO

## 2024-04-03 NOTE — ANESTHESIA POSTPROCEDURE EVALUATION
Patient: Ashley Pedraza    Procedure Summary       Date: 04/03/24 Room / Location: [facility] / Virtual [facility]    Anesthesia Start: 1220 Anesthesia Stop: 1354    Procedures:       Repair Tendon with or without Tendon Graft Upper Extremity (Right: Arm Upper)      RELEASE,SOFT TISSUE,UPPER EXTREMITY ( arthrex ) (Right: Arm Upper) Diagnosis:       Impingement syndrome of right shoulder      Bicipital tendinitis of right shoulder      (Impingement syndrome of right shoulder [M75.41])      (Bicipital tendinitis of right shoulder [M75.21])    Surgeons: Feliciano Echeverria MD Responsible Provider: Kb Caruso MD    Anesthesia Type: general, regional ASA Status: 2            Anesthesia Type: general, regional    Vitals Value Taken Time   /84 04/03/24 1405   Temp 36.2 °C (97.2 °F) 04/03/24 1349   Pulse 74 04/03/24 1405   Resp 12 04/03/24 1349   SpO2 96 % 04/03/24 1405       Anesthesia Post Evaluation    Patient location during evaluation: PACU  Patient participation: complete - patient participated  Level of consciousness: awake and alert  Pain score: 3  Pain management: adequate  Multimodal analgesia pain management approach  Airway patency: patent  Two or more strategies used to mitigate risk of obstructive sleep apnea  Cardiovascular status: acceptable and blood pressure returned to baseline  Respiratory status: acceptable  Hydration status: acceptable  Postoperative Nausea and Vomiting: none  Comments: Block appears to be working well.        There were no known notable events for this encounter.

## 2024-04-03 NOTE — ANESTHESIA PROCEDURE NOTES
Peripheral Block    Patient location during procedure: pre-op  Start time: 4/3/2024 11:32 AM  End time: 4/3/2024 11:38 AM  Reason for block: at surgeon's request and post-op pain management  Staffing  Performed: attending   Authorized by: Kb Caruso MD    Performed by: Kb Caruso MD  Preanesthetic Checklist  Completed: patient identified, IV checked, site marked, risks and benefits discussed, surgical consent, monitors and equipment checked, pre-op evaluation and timeout performed   Timeout performed at: 4/3/2024 11:29 AM  Peripheral Block  Patient position: laying flat  Prep: alcohol swabs  Patient monitoring: heart rate, cardiac monitor and continuous pulse ox  Block type: other  Laterality: right  Injection technique: single-shot  Needle  Needle type: short-bevel   Needle gauge: 22 G  Needle length: 5 cm  Needle localization: ultrasound guidance  Assessment  Injection assessment: negative aspiration for heme, no paresthesia on injection and incremental injection  Paresthesia pain: none  Heart rate change: no  Slow fractionated injection: yes  Additional Notes  This was an intercostobrachial plexus block

## 2024-04-03 NOTE — OP NOTE
Repair Tendon with or without Tendon Graft Upper Extremity (R), RELEASE,SOFT TISSUE,UPPER EXTREMITY ( arthrex ) (R) Operative Note     Date: 4/3/2024  OR Location: [facility]    Name: Ashley Pedraza, : 1980, Age: 44 y.o., MRN: [id number], Sex: male    Diagnosis  Pre-op Diagnosis     * Impingement syndrome of right shoulder [M75.41]     * Bicipital tendinitis of right shoulder [M75.21] Post-op Diagnosis     * Impingement syndrome of right shoulder [M75.41]     * Bicipital tendinitis of right shoulder [M75.21]     Procedures  #1 right shoulder open biceps tenodesis  #2 right shoulder arthroscopic major joint debridement of labral tear biceps tear joint synovitis with undersurface fraying of the anterior supraspinatus rotator cuff approximately 15%  #3 right shoulder arthroscopic anterior acromioplasty extensive decompression bursectomy for bursitis and impinging lesion    Surgeons      * Feliciano Echeverria - Primary    Resident/Fellow/Other Assistant:  Surgeon(s) and Role:  Merline Weaver PA-C  Procedure Summary  Anesthesia: General  ASA: II  Anesthesia Staff: Anesthesiologist: Kb Caruso MD  C-AA: BETH Michael  Estimated Blood Loss: 20mL  Intra-op Medications:   Administrations occurring from 1215 to 1330 on 24:   Medication Name Total Dose   EPINEPHrine (Adrenalin) 1 mg in sodium chloride 0.9 % 3,000 mL irrigation Cannot be calculated   ceFAZolin in dextrose (iso-os) (Ancef) IVPB 2 g 2 g              Anesthesia Record               Intraprocedure I/O Totals          Intake    lactated Ringer's infusion 1000.00 mL    ceFAZolin in dextrose (iso-os) (Ancef) IVPB 2 g 100.00 mL    Total Intake 1100 mL       Output    Est. Blood Loss 10 mL    Total Output 10 mL       Net    Net Volume 1090 mL          Specimen: No specimens collected     Staff:   Circulator: Rosmery Donahue RN  Scrub Person: Angelina Okeefe RN; Roxy Kartik         Drains and/or Catheters: * None in log *    Tourniquet Times:          Implants:  Implants       Type Name Action Serial No.      Implant KIT, IMPLANT SYSTEM, PROXIMAL TENODESIS - [id number] Implanted               Findings: As above    Indications: Ashley Pedraza is an 44 y.o. male who is having surgery for Impingement syndrome of right shoulder [M75.41]  Bicipital tendinitis of right shoulder [M75.21].  Kirk patient comes in today for operative treatment especially with the biceps symptoms and impingement.  Understands clearly the severe risk such as nerve artery tendon damage infection continued pain and future tearing especially with the partial-thickness rotator cuff tear he understands rehabilitation postoperative care answered all of his preoperative questions and he wished proceed we do need to place a C9 for the decompression which was necessary for the impinging lesion and also the major joint debridement and we saw the partial-thickness rotator cuff tear that is clearly related to his injury to reasonably of medical probability.        The patient was seen in the preoperative area. The risks, benefits, complications, treatment options, non-operative alternatives, expected recovery and outcomes were discussed with the patient. The possibilities of reaction to medication, pulmonary aspiration, injury to surrounding structures, bleeding, recurrent infection, the need for additional procedures, failure to diagnose a condition, and creating a complication requiring transfusion or operation were discussed with the patient. The patient concurred with the proposed plan, giving informed consent.  The site of surgery was properly noted/marked if necessary per policy. The patient has been actively warmed in preoperative area. Preoperative antibiotics have been ordered and given within 1 hours of incision. Venous thrombosis prophylaxis have been ordered including bilateral sequential compression devices    Procedure Details: Kirk patient brought the operating room after  sterilely prepped and draped informed timeout we placed in the left lateral decubitus position well-padded on beanbag with an axillary roll no adhesive capsulitis was seen and easily entered the joint there was some synovitis of the interval and tearing of the biceps and a SLAP type lesion but no gross labral detachment we tenotomized the biceps did a major joint debridement and there was 15% tear of the undersurface of the cuff but nothing significantly debrided this back the cartilaginous surfaces looked excellent.    At this point we turned our attention to the subacromial space and he was tight anteriorly with a large impinging lesion and we did an arthroscopic bursectomy subacromial decompression anterior acromioplasty the cuff looked excellent from the bursal side with no significant tearing    This point we made a 4 cm subpectoral incision easily harvesting the biceps tendon performed unicortical biceps tenodesis under appropriate tension we copiously irrigated closed all wounds patient was placed in abduction pillow sling there were no complications he will be started in therapy postoperative care discussed.  Merline Weaver PA-C acted as surgical assistant during this case and assistance greatly reduced operative time and aided in performance of the case  Complications:  None; patient tolerated the procedure well.    Disposition: PACU - hemodynamically stable.  Condition: stable         Additional Details: 0    Attending Attestation: I performed the procedure.    Feliciano Echeverria  Phone Number: [phone]

## 2024-04-03 NOTE — DISCHARGE INSTRUCTIONS
You had an arthroscopic shoulder surgery today.  A nerve block was done preoperatively to help with pain, this can make the whole arm very numb for 18 to 24 hours postoperatively.  It will slowly begin to wear off, likely starting in the fingers and hand tingling and will slowly work up the arm.      Once the nerve block wears off, allow the elbow to straighten when sitting and laying down, he should have the sling on for all activity and at night.    You should be seen in therapy about 3 days postoperatively, this may have been set up for you or we have discussed where you are going for therapy, if you are having trouble getting into see someone please call the office.    Postoperative dressing can be removed in 3 days or you can wait until therapy to remove it, it is okay to get the wounds wet after 5 days and you can apply new Band-Aids over the sutures so they do not get stuck and pull on clothing.    Home pain medication has been sent to your pharmacy, if able you can supplement with 800 mg of ibuprofen and alternate every 4-6 hours with the prescription pain medication and slowly wean off of this as quickly as possible.

## 2024-04-03 NOTE — PERIOPERATIVE NURSING NOTE
Pt received from OR via cart, monitors on, report received.  Operative extremity in sling, drsg D/I, radial pulse +3. Remaining assessment completed as documented, orders reviewed/released. Pt safety maintained.

## 2024-04-03 NOTE — ANESTHESIA PROCEDURE NOTES
Airway  Date/Time: 4/3/2024 12:40 PM  Urgency: elective      Staffing  Performed: BETH   Authorized by: Kb Caruso MD    Performed by: BETH Michael  Patient location during procedure: OR    Indications and Patient Condition  Indications for airway management: anesthesia  Preoxygenated: yes  Mask difficulty assessment: 1 - vent by mask    Final Airway Details  Final airway type: endotracheal airway      Successful airway: ETT  Cuffed: yes   Successful intubation technique: direct laryngoscopy  Endotracheal tube insertion site: oral  Blade: Benji  Blade size: #3  ETT size (mm): 7.5  Cormack-Lehane Classification: grade I - full view of glottis  Measured from: lips  ETT to lips (cm): 23  Number of attempts at approach: 1

## 2024-04-03 NOTE — ANESTHESIA PROCEDURE NOTES
Peripheral Block    Patient location during procedure: pre-op  Start time: 4/3/2024 11:33 AM  End time: 4/3/2024 11:35 AM  Reason for block: at surgeon's request and post-op pain management  Staffing  Performed: attending   Authorized by: Kb Caruso MD    Performed by: Kb Caruso MD  Preanesthetic Checklist  Completed: patient identified, IV checked, site marked, risks and benefits discussed, surgical consent, monitors and equipment checked, pre-op evaluation and timeout performed   Timeout performed at: 4/3/2024 11:29 AM  Peripheral Block  Patient position: laying flat  Prep: alcohol swabs  Patient monitoring: heart rate, cardiac monitor and continuous pulse ox  Block type: interscalene  Laterality: right  Injection technique: single-shot  Guidance: nerve stimulator and ultrasound guided  Needle  Needle type: short-bevel   Needle gauge: 22 G  Needle length: 5 cm  Needle localization: anatomical landmarks, ultrasound guidance and nerve stimulator  Needle insertion depth: 4 cm  Assessment  Injection assessment: negative aspiration for heme, no paresthesia on injection, incremental injection and local visualized surrounding nerve on ultrasound  Paresthesia pain: none  Heart rate change: no  Slow fractionated injection: yes  Additional Notes  Dexamethasone 10 mg added to local

## 2024-04-03 NOTE — ANESTHESIA PREPROCEDURE EVALUATION
Patient: Ashley Pedraza    Procedure Information       Date/Time: 04/03/24 1215    Procedures:       Repair Tendon with or without Tendon Graft Upper Extremity (Right: Arm Upper) - Right shoulder arthroscopic decompression and biceps tenodesis      RELEASE,SOFT TISSUE,UPPER EXTREMITY ( arthrex ) (Right: Arm Upper)    Location: [facility] / [facility]    Surgeons: Feliciano Echeverria MD          Past Medical History:   Diagnosis Date    ADHD        Relevant Problems   No relevant active problems         Clinical information reviewed:                 H/H= 11/36    NPO Detail:  No data recorded     Physical Exam    Airway  Mallampati: I  TM distance: >3 FB  Neck ROM: full     Cardiovascular   Comments: deferred   Dental    Pulmonary   Comments: deferred   Abdominal     Comments: deferred           Anesthesia Plan    History of general anesthesia?: no  History of complications of general anesthesia?: no    ASA 2     general and regional     The patient is not a current smoker.    intravenous induction   Postoperative administration of opioids is intended.  Anesthetic plan and risks discussed with patient.    Plan discussed with CAA.

## 2024-04-04 ENCOUNTER — APPOINTMENT (OUTPATIENT)
Dept: ORTHOPEDIC SURGERY | Facility: HOSPITAL | Age: 44
End: 2024-04-04
Payer: COMMERCIAL

## 2024-04-18 ENCOUNTER — OFFICE VISIT (OUTPATIENT)
Dept: ORTHOPEDIC SURGERY | Facility: HOSPITAL | Age: 44
End: 2024-04-18
Payer: COMMERCIAL

## 2024-04-18 DIAGNOSIS — M75.41 IMPINGEMENT SYNDROME OF RIGHT SHOULDER: Primary | ICD-10-CM

## 2024-04-18 PROCEDURE — 1036F TOBACCO NON-USER: CPT | Performed by: ORTHOPAEDIC SURGERY

## 2024-04-18 PROCEDURE — 99024 POSTOP FOLLOW-UP VISIT: CPT | Performed by: ORTHOPAEDIC SURGERY

## 2024-04-18 ASSESSMENT — PAIN SCALES - GENERAL: PAINLEVEL_OUTOF10: 0 - NO PAIN

## 2024-04-18 ASSESSMENT — PAIN - FUNCTIONAL ASSESSMENT: PAIN_FUNCTIONAL_ASSESSMENT: 0-10

## 2024-04-18 NOTE — PROGRESS NOTES
Reason for Appointment  Chief Complaint   Patient presents with    Right Shoulder - Post-op     History of Present Illness  Patient is here today 2 weeks s/p a right shoulder arthroscopic decompression and biceps tenodesis on 4/3/24. Patient is doing well overall.  He is having minimal pain in the shoulder, he will having some weakness with raising the arm overhead which is very normal.  He will work on active and passive shoulder motion for the next 4 weeks.  Wounds are healing nicely with no signs of infection, sutures removed today.  He understands no heavy lifting with this arm.  We will follow-up with him in 4 weeks.    Assessment   Right shoulder impingement

## 2024-05-16 ENCOUNTER — OFFICE VISIT (OUTPATIENT)
Dept: ORTHOPEDIC SURGERY | Facility: HOSPITAL | Age: 44
End: 2024-05-16
Payer: COMMERCIAL

## 2024-05-16 DIAGNOSIS — M75.41 IMPINGEMENT SYNDROME OF RIGHT SHOULDER: Primary | ICD-10-CM

## 2024-05-16 PROCEDURE — 99024 POSTOP FOLLOW-UP VISIT: CPT | Performed by: ORTHOPAEDIC SURGERY

## 2024-05-16 ASSESSMENT — PAIN SCALES - GENERAL: PAINLEVEL_OUTOF10: 2

## 2024-05-16 ASSESSMENT — PAIN - FUNCTIONAL ASSESSMENT: PAIN_FUNCTIONAL_ASSESSMENT: 0-10

## 2024-05-16 NOTE — PROGRESS NOTES
Reason for Appointment  Chief Complaint   Patient presents with    Right Shoulder - Post-op, Follow-up     History of Present Illness  Patient is here today 6 weeks s/p a right shoulder arthroscopic decompression and biceps tenodesis. Patient is doing well, he is slowly making improvement.  He is still lacking about 30 degrees of active forward flexion on that side compared to the left side.  Wounds are fully healed with no signs of infection.  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  We will follow-up with him in 8 weeks.    Assessment   Right shoulder impingement    Written by Merline Echeverria saw, evaluated, and treated the patient with the PA

## 2024-05-31 ENCOUNTER — TELEPHONE (OUTPATIENT)
Dept: ORTHOPEDIC SURGERY | Facility: CLINIC | Age: 44
End: 2024-05-31
Payer: COMMERCIAL

## 2024-05-31 DIAGNOSIS — M75.21 BICIPITAL TENDINITIS OF RIGHT SHOULDER: ICD-10-CM

## 2024-05-31 DIAGNOSIS — M75.41 IMPINGEMENT SYNDROME OF RIGHT SHOULDER: Primary | ICD-10-CM

## 2024-07-18 ENCOUNTER — APPOINTMENT (OUTPATIENT)
Dept: ORTHOPEDIC SURGERY | Facility: HOSPITAL | Age: 44
End: 2024-07-18
Payer: COMMERCIAL

## 2024-07-25 ENCOUNTER — OFFICE VISIT (OUTPATIENT)
Dept: ORTHOPEDIC SURGERY | Facility: HOSPITAL | Age: 44
End: 2024-07-25
Payer: COMMERCIAL

## 2024-07-25 DIAGNOSIS — M75.41 IMPINGEMENT SYNDROME OF RIGHT SHOULDER: Primary | ICD-10-CM

## 2024-07-25 PROCEDURE — 99213 OFFICE O/P EST LOW 20 MIN: CPT | Performed by: ORTHOPAEDIC SURGERY

## 2024-07-25 ASSESSMENT — ENCOUNTER SYMPTOMS
CHILLS: 0
WHEEZING: 0
SINUS PRESSURE: 0
ARTHRALGIAS: 0
FEVER: 0
SHORTNESS OF BREATH: 0
TROUBLE SWALLOWING: 0
FATIGUE: 0

## 2024-07-25 ASSESSMENT — PAIN - FUNCTIONAL ASSESSMENT: PAIN_FUNCTIONAL_ASSESSMENT: 0-10

## 2024-07-25 ASSESSMENT — PAIN SCALES - GENERAL: PAINLEVEL_OUTOF10: 0 - NO PAIN

## 2024-07-25 NOTE — PROGRESS NOTES
Reason for Appointment  Chief Complaint   Patient presents with    Right Shoulder - Follow-up, Post-op     History of Present Illness  Patient is a 44 y.o. male here today for a [facility] case follow-up evaluation of his right shoulder.  He is 3 and half months status post a right shoulder arthroscopic decompression and biceps tenodesis.  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  He is working in physical therapy on strengthening.  No other changes in his past medical history, allergies, or medications.    Past Medical History:   Diagnosis Date    ADHD        Past Surgical History:   Procedure Laterality Date    NO PAST SURGERIES      WISDOM TOOTH EXTRACTION Bilateral        Medication Documentation Review Audit       Reviewed by Merline Weaver PA-C (Physician Assistant) on 07/25/24 at 1450      Medication Order Taking? Sig Documenting Provider Last Dose Status   amphetamine-dextroamphetamine (Adderall) 15 mg tablet [id number] Yes Take 1 tablet (15 mg) by mouth every 12 hours. Historical Provider, MD Taking Active   methylPREDNISolone acetate (DEPO-Medrol) injection 80 mg [id number]   Feliciano Echeverria MD  Active   naproxen sodium (Aleve) 220 mg tablet [id number] Yes Take by mouth every 12 hours if needed. Historical Provider, MD Taking Active   tiZANidine (Zanaflex) 4 mg tablet [id number] Yes Take 1 tablet (4 mg) by mouth as needed at bedtime. Historical Provider, MD Taking Active   triamcinol ac (PF) in 0.9%NaCl (KENALOG) injection 40 mg [id number]   Feliciano Echeverria MD  Active                    No Known Allergies    Review of Systems   Constitutional:  Negative for chills, fatigue and fever.   HENT:  Negative for postnasal drip, sinus pressure and trouble swallowing.    Respiratory:  Negative for shortness of breath and wheezing.    Cardiovascular:  Negative for chest pain and leg swelling.    Musculoskeletal:  Negative for arthralgias.   Skin:  Negative for pallor and rash.     Exam   On exam the right shoulder shows good active forward flexion, only lacking less than 10 degrees of active forward flexion compared to the opposite side.  He does have good cuff strength with resisted external rotation and minimally positive impingement signs today.  Deltoid is functional.  Good pulses and sensation in the upper extremity.    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  There is no light duty work for him and it is important for him not to return back to work too early to reduce his risk of reinjury.  We will keep him off for another 6 weeks and reassess him at that point to return back to work.

## 2024-09-05 ENCOUNTER — OFFICE VISIT (OUTPATIENT)
Dept: ORTHOPEDIC SURGERY | Facility: HOSPITAL | Age: 44
End: 2024-09-05
Payer: COMMERCIAL

## 2024-09-05 DIAGNOSIS — M75.41 IMPINGEMENT SYNDROME OF RIGHT SHOULDER: Primary | ICD-10-CM

## 2024-09-05 PROCEDURE — 99213 OFFICE O/P EST LOW 20 MIN: CPT | Performed by: ORTHOPAEDIC SURGERY

## 2024-09-05 ASSESSMENT — ENCOUNTER SYMPTOMS
TROUBLE SWALLOWING: 0
CHILLS: 0
ARTHRALGIAS: 0
FATIGUE: 0
WHEEZING: 0
COLOR CHANGE: 0
FEVER: 0
SHORTNESS OF BREATH: 0
SINUS PAIN: 0
JOINT SWELLING: 0

## 2024-09-05 ASSESSMENT — PAIN - FUNCTIONAL ASSESSMENT: PAIN_FUNCTIONAL_ASSESSMENT: 0-10

## 2024-09-05 ASSESSMENT — PAIN SCALES - GENERAL: PAINLEVEL_OUTOF10: 0 - NO PAIN

## 2024-09-05 NOTE — PROGRESS NOTES
Reason for Appointment  Chief Complaint   Patient presents with    Right Shoulder - Follow-up, Post-op     History of Present Illness  Patient is a 44 y.o. male here today for a [facility] case follow-up evaluation of right shoulder pain.  He is about 5 months status post a right shoulder arthroscopic decompression and biceps tenodesis.  He has continued to work in therapy on regaining endurance and strength in that arm as he has the we will go back to a very physical job.  He feels that he is making improvement in this area.  He has minimal pain in the shoulder today but he is worried about going back to full duty as there is no light duty for him to return to.  No other changes in his past medical history, allergies, or medications..     Past Medical History:   Diagnosis Date    ADHD        Past Surgical History:   Procedure Laterality Date    NO PAST SURGERIES      WISDOM TOOTH EXTRACTION Bilateral        Medication Documentation Review Audit       Reviewed by Merline Weaver PA-C (Physician Assistant) on 09/05/24 at 1501      Medication Order Taking? Sig Documenting Provider Last Dose Status   amphetamine-dextroamphetamine (Adderall) 15 mg tablet [id number] Yes Take 1 tablet (15 mg) by mouth every 12 hours. Historical Provider, MD Taking Active   methylPREDNISolone acetate (DEPO-Medrol) injection 80 mg [id number]   Feliciano Echeverria MD  Active   naproxen sodium (Aleve) 220 mg tablet [id number] Yes Take by mouth every 12 hours if needed. Historical Provider, MD Taking Active   tiZANidine (Zanaflex) 4 mg tablet [id number] Yes Take 1 tablet (4 mg) by mouth as needed at bedtime. Historical Provider, MD Taking Active   triamcinol ac (PF) in 0.9%NaCl (KENALOG) injection 40 mg [id number]   Feliciano Echeverria MD  Active                    No Known Allergies    Review of Systems   Constitutional:  Negative for chills, fatigue and fever.   HENT:  Negative for mouth sores, sinus pain and trouble swallowing.    Respiratory:  Negative for  shortness of breath and wheezing.    Cardiovascular:  Negative for chest pain and leg swelling.   Musculoskeletal:  Negative for arthralgias and joint swelling.   Skin:  Negative for color change and pallor.     Exam   On exam right shoulder is only lacking about 10 degrees of forward flexion compared to the left side.  He has good cuff strength with resisted external rotation, negative impingement signs today.  Deltoid is functional.  Good pulses and sensation in the upper extremity.  We will  Assessment   Right shoulder impingement    Plan   Keep him off work for the  4 weeks and then we will allow him to return to work full duty starting 10/14/2024.  Will place a C9 for continued physical therapy over the next 4 weeks to continue to work on endurance and strength in that arm as maximizing his strength before returning back to a very labor-intensive type job reduces his risk of reinjury.  We will follow-up with him in 2 months.

## 2024-11-07 ENCOUNTER — OFFICE VISIT (OUTPATIENT)
Dept: ORTHOPEDIC SURGERY | Facility: HOSPITAL | Age: 44
End: 2024-11-07
Payer: COMMERCIAL

## 2024-11-07 DIAGNOSIS — M75.22 BICIPITAL TENDINITIS OF LEFT SHOULDER: Primary | ICD-10-CM

## 2024-11-07 DIAGNOSIS — M75.21 BICIPITAL TENDINITIS OF RIGHT SHOULDER: ICD-10-CM

## 2024-11-07 PROCEDURE — 99213 OFFICE O/P EST LOW 20 MIN: CPT | Performed by: ORTHOPAEDIC SURGERY

## 2024-11-07 ASSESSMENT — ENCOUNTER SYMPTOMS
JOINT SWELLING: 0
SINUS PRESSURE: 0
FEVER: 0
SHORTNESS OF BREATH: 0
CHILLS: 0
ARTHRALGIAS: 1
WHEEZING: 0
FATIGUE: 0

## 2024-11-07 ASSESSMENT — PAIN - FUNCTIONAL ASSESSMENT: PAIN_FUNCTIONAL_ASSESSMENT: 0-10

## 2024-11-07 ASSESSMENT — PAIN SCALES - GENERAL: PAINLEVEL_OUTOF10: 3

## 2024-11-07 NOTE — PROGRESS NOTES
Reason for Appointment  Chief Complaint   Patient presents with    Right Shoulder - Follow-up, Post-op     History of Present Illness  Patient is a 44 y.o. male here today for a [facility] case follow-up evaluation of 7 months status post right shoulder arthroscopic decompression and biceps tenodesis.  Right shoulder is doing well, having no significant pain or issues on that side.  He does have biceps tendinitis on his left shoulder as well and does a very repetitive and heavy job.  He has had some increased anterior left shoulder pain again that is been increasing just over the last month or so, he has had injections in the past that gave him good relief.  He is working full duty.  No other changes in his past medical history, allergies, or medications.    Past Medical History:   Diagnosis Date    ADHD        Past Surgical History:   Procedure Laterality Date    NO PAST SURGERIES      WISDOM TOOTH EXTRACTION Bilateral        Medication Documentation Review Audit       Reviewed by Kalpana Kang MA (Medical Assistant) on 11/07/24 at 0950      Medication Order Taking? Sig Documenting Provider Last Dose Status   amphetamine-dextroamphetamine (Adderall) 15 mg tablet [id number] Yes Take 1 tablet (15 mg) by mouth every 12 hours. Historical Provider, MD Taking Active   methylPREDNISolone acetate (DEPO-Medrol) injection 80 mg [id number]   Feliciano Echeverria MD  Active   naproxen sodium (Aleve) 220 mg tablet [id number] Yes Take by mouth every 12 hours if needed. Historical Provider, MD Taking Active   tiZANidine (Zanaflex) 4 mg tablet [id number] Yes Take 1 tablet (4 mg) by mouth as needed at bedtime. Historical Provider, MD Taking Active   triamcinol ac (PF) in 0.9%NaCl (KENALOG) injection 40 mg [id number]   Feliciano Echeverria MD  Active                    No Known Allergies    Review of Systems   Constitutional:  Negative for chills, fatigue and fever.   HENT:  Negative for nosebleeds, sinus pressure and tinnitus.    Respiratory:   Negative for shortness of breath and wheezing.    Cardiovascular:  Negative for chest pain and leg swelling.   Musculoskeletal:  Positive for arthralgias. Negative for joint swelling.   Skin:  Negative for pallor and rash.     Exam   On exam both shoulders show good active forward flexion, he has excellent cuff strength bilaterally.  Tenderness anteriorly over the left biceps tendon sheath and pain with an open palm assistance test on the left.  Deltoids are functional bilaterally.  Good pulses and sensation in the upper extremities.    Assessment   Left biceps tendinitis  Right biceps tendinitis    Plan   He is having more left biceps tendinitis symptoms that have started over the last month.  He does a very heavy repetitive job and has returned back to work full duty after recent right shoulder surgery.  We will continue his 40-hour workweek limit for the next 3 months to not overstress the shoulders.  We will also place a C9 for a left biceps tendon sheath injection, these have given him good relief in the past.  We did discuss possible left shoulder surgery in the future, he has done well on his right side but again he  has just recovered from his right side and has not ready to proceed with any surgery on his left shoulder.    I, Merline Weaver PA-C, am acting as a scribe and attest that this documentation has been prepared under the direction and in the presence of Feliciano Echeverria MD.    By signing below, I, Feliciano Echeverria MD, personally performed the services described in this documentation. All medical record entries made by the scribe were at my direction and in my presence. I have reviewed the chart and agree that the record reflects my personal performance and is accurate and complete.

## 2024-12-05 ENCOUNTER — APPOINTMENT (OUTPATIENT)
Dept: ORTHOPEDIC SURGERY | Facility: HOSPITAL | Age: 44
End: 2024-12-05
Payer: COMMERCIAL

## 2024-12-19 ENCOUNTER — OFFICE VISIT (OUTPATIENT)
Dept: ORTHOPEDIC SURGERY | Facility: HOSPITAL | Age: 44
End: 2024-12-19
Payer: COMMERCIAL

## 2024-12-19 DIAGNOSIS — M75.22 BICIPITAL TENDINITIS OF LEFT SHOULDER: Primary | ICD-10-CM

## 2024-12-19 PROCEDURE — 76942 ECHO GUIDE FOR BIOPSY: CPT | Performed by: ORTHOPAEDIC SURGERY

## 2024-12-19 PROCEDURE — 2500000004 HC RX 250 GENERAL PHARMACY W/ HCPCS (ALT 636 FOR OP/ED): Performed by: ORTHOPAEDIC SURGERY

## 2024-12-19 RX ORDER — METHYLPREDNISOLONE ACETATE 40 MG/ML
30 INJECTION, SUSPENSION INTRA-ARTICULAR; INTRALESIONAL; INTRAMUSCULAR; SOFT TISSUE
Status: COMPLETED | OUTPATIENT
Start: 2024-12-19 | End: 2024-12-19

## 2024-12-19 RX ORDER — LIDOCAINE HYDROCHLORIDE 10 MG/ML
2 INJECTION, SOLUTION INFILTRATION; PERINEURAL
Status: COMPLETED | OUTPATIENT
Start: 2024-12-19 | End: 2024-12-19

## 2024-12-19 ASSESSMENT — PAIN - FUNCTIONAL ASSESSMENT: PAIN_FUNCTIONAL_ASSESSMENT: 0-10

## 2024-12-19 ASSESSMENT — PAIN SCALES - GENERAL: PAINLEVEL_OUTOF10: 0 - NO PAIN

## 2024-12-19 NOTE — PROGRESS NOTES
Reason for Appointment  Chief Complaint   Patient presents with    Right Shoulder - Injections     History of Present Illness  Patient is a 44 y.o. male here today for an approved left biceps injection. This is a [facility] case. We last saw the patient on 11/7/24 when he was 7 months status post right shoulder arthroscopic decompression and biceps tenodesis and we discussed a C9 for a left biceps tendon sheath injection. No recent falls or injuries. No other changes in past medical history, allergies, or medications.        Past Medical History:   Diagnosis Date    ADHD        Past Surgical History:   Procedure Laterality Date    NO PAST SURGERIES      WISDOM TOOTH EXTRACTION Bilateral        Medication Documentation Review Audit       Reviewed by Kalpana Kang MA (Medical Assistant) on 12/19/24 at 0800      Medication Order Taking? Sig Documenting Provider Last Dose Status   amphetamine-dextroamphetamine (Adderall) 15 mg tablet [id number] Yes Take 1 tablet (15 mg) by mouth every 12 hours. Historical Provider, MD Taking Active   methylPREDNISolone acetate (DEPO-Medrol) injection 80 mg [id number]   Feliciano Echeverria MD  Active   naproxen sodium (Aleve) 220 mg tablet [id number] Yes Take by mouth every 12 hours if needed. Historical Provider, MD Taking Active   tiZANidine (Zanaflex) 4 mg tablet [id number] Yes Take 1 tablet (4 mg) by mouth as needed at bedtime. Historical Provider, MD Taking Active   triamcinol ac (PF) in 0.9%NaCl (KENALOG) injection 40 mg [id number]   Feliciano Echeverria MD  Active                    No Known Allergies    Review of Systems    Exam     Assessment   Left biceps tendinitis  Right biceps tendinitis    Plan   We will do one cortisone injection into the left biceps tendon sheath in hopes to calm their symptoms nicely. Pt understands the small risk of infection and warning signs including flare reaction. We will continue with his restrictions. He can follow up with us in 6 weeks.     Patient ID: Ashley  VILMA Pedraza is a 44 y.o. male.    Tendon Sheath Injection: left long head of biceps tendon sheath on 12/19/2024 8:18 AM  Indications: pain  Details: ultrasound-guided  Medications: 2 mL lidocaine 10 mg/mL (1 %); 30 mg methylPREDNISolone acetate 40 mg/mL  Outcome: tolerated well, no immediate complications    After discussing the risks and benefits of the procedure with proceeded with an injection. Using ultrasound guidance we identified the greater and lesser tuberosities and the biceps tendons sheath, images obtained and saved. We then sterilely injected the left biceps tendon sheath with a mixture of 30 mg of Depo-Medrol and 2 cc of 1 % lidocaine. Pt tolerated the procedure well without any adverse reactions    Procedure, treatment alternatives, risks and benefits explained, specific risks discussed. Consent was given by the patient. Immediately prior to procedure a time out was called to verify the correct patient, procedure, equipment, support staff and site/side marked as required. Patient was prepped and draped in the usual sterile fashion.             I, Cristina Chen, attest that this documentation has been prepared under the direction and in the presence of Feliciano Echeverria MD.   By signing below, IFeliciano MD, personally performed the services described in this documentation. All medical record entries made by the scribe were at my direction and in my presence. I have reviewed the chart and agree that the record reflects my personal performance and is accurate and complete.

## 2025-01-30 ENCOUNTER — OFFICE VISIT (OUTPATIENT)
Dept: ORTHOPEDIC SURGERY | Facility: HOSPITAL | Age: 45
End: 2025-01-30
Payer: COMMERCIAL

## 2025-01-30 DIAGNOSIS — M75.22 BICIPITAL TENDINITIS OF LEFT SHOULDER: Primary | ICD-10-CM

## 2025-01-30 DIAGNOSIS — M75.21 BICIPITAL TENDINITIS OF RIGHT SHOULDER: ICD-10-CM

## 2025-01-30 PROCEDURE — 99213 OFFICE O/P EST LOW 20 MIN: CPT | Performed by: ORTHOPAEDIC SURGERY

## 2025-01-30 ASSESSMENT — ENCOUNTER SYMPTOMS
FEVER: 0
WHEEZING: 0
SHORTNESS OF BREATH: 0
FATIGUE: 0
CHILLS: 0
BRUISES/BLEEDS EASILY: 0

## 2025-01-30 ASSESSMENT — PAIN - FUNCTIONAL ASSESSMENT: PAIN_FUNCTIONAL_ASSESSMENT: 0-10

## 2025-01-30 ASSESSMENT — PAIN SCALES - GENERAL: PAINLEVEL_OUTOF10: 0 - NO PAIN

## 2025-01-30 NOTE — PROGRESS NOTES
Reason for Appointment  Chief Complaint   Patient presents with    Left Shoulder - Follow-up     History of Present Illness  Patient is a 44 y.o. male here today for follow-up evaluation of left shoulder. This is a [facility] case. We last saw the patient on 12/19/24 for right shoulder pain and we gave a left biceps injection. Today he reports the last injection gave him excellent relief and he is still benefiting from the injection. He does reports popping in the right shoulder, but he is having no pain. He is doing well with the restrictions. No recent falls or injuries. No other changes in past medical history, allergies, or medications.        Past Medical History:   Diagnosis Date    ADHD        Past Surgical History:   Procedure Laterality Date    NO PAST SURGERIES      WISDOM TOOTH EXTRACTION Bilateral        Medication Documentation Review Audit       Reviewed by Kalpana Kang MA (Medical Assistant) on 01/30/25 at 0755      Medication Order Taking? Sig Documenting Provider Last Dose Status   amphetamine-dextroamphetamine (Adderall) 15 mg tablet [id number] Yes Take 1 tablet (15 mg) by mouth every 12 hours. Historical Provider, MD Taking Active   methylPREDNISolone acetate (DEPO-Medrol) injection 80 mg [id number]   Feliciano Echeverria MD  Active   naproxen sodium (Aleve) 220 mg tablet [id number] Yes Take by mouth every 12 hours if needed. Historical Provider, MD Taking Active   tiZANidine (Zanaflex) 4 mg tablet [id number] Yes Take 1 tablet (4 mg) by mouth as needed at bedtime. Historical Provider, MD Taking Active   triamcinol ac (PF) in 0.9%NaCl (KENALOG) injection 40 mg [id number]   Feliciano Echeverria MD  Active                    No Known Allergies    Review of Systems   Constitutional:  Negative for chills, fatigue and fever.   Respiratory:  Negative for shortness of breath and wheezing.    Cardiovascular:  Negative for chest pain and leg swelling.   Allergic/Immunologic: Negative for immunocompromised state.    Hematological:  Does not bruise/bleed easily.       Exam   Full symmetric rom. Excellent cuff strength internal external. Mild biceps deformity on the left. Less tender over the left biceps with good rotation. Good pulses and sensation.   Assessment   Left biceps tendinitis  Right biceps tendinitis    Plan   We discussed after the surgery he had the popping he hears is normal. We will continue his working restrictions of a 40 hour work week, 8 hour days, no overtime. He can follow up with us in 4 months. We discussed being careful with any heavy lifting.        I, Cristina Chen, attest that this documentation has been prepared under the direction and in the presence of Feliciano Echeverria MD.   By signing below, I, Feliciano Echeverria MD, personally performed the services described in this documentation. All medical record entries made by the scribe were at my direction and in my presence. I have reviewed the chart and agree that the record reflects my personal performance and is accurate and complete.

## 2025-05-29 ENCOUNTER — APPOINTMENT (OUTPATIENT)
Dept: ORTHOPEDIC SURGERY | Facility: HOSPITAL | Age: 45
End: 2025-05-29
Payer: COMMERCIAL

## 2025-06-05 ENCOUNTER — OFFICE VISIT (OUTPATIENT)
Dept: ORTHOPEDIC SURGERY | Facility: HOSPITAL | Age: 45
End: 2025-06-05
Payer: COMMERCIAL

## 2025-06-05 DIAGNOSIS — M75.21 BICIPITAL TENDINITIS OF RIGHT SHOULDER: ICD-10-CM

## 2025-06-05 DIAGNOSIS — M75.22 BICIPITAL TENDINITIS OF LEFT SHOULDER: Primary | ICD-10-CM

## 2025-06-05 PROCEDURE — 99213 OFFICE O/P EST LOW 20 MIN: CPT | Performed by: ORTHOPAEDIC SURGERY

## 2025-06-05 ASSESSMENT — PAIN SCALES - GENERAL: PAINLEVEL_OUTOF10: 0 - NO PAIN

## 2025-06-05 ASSESSMENT — PAIN - FUNCTIONAL ASSESSMENT: PAIN_FUNCTIONAL_ASSESSMENT: 0-10

## 2025-06-06 ASSESSMENT — ENCOUNTER SYMPTOMS
FEVER: 0
SINUS PAIN: 0
COLOR CHANGE: 0
CHILLS: 0
JOINT SWELLING: 0
FATIGUE: 0
SHORTNESS OF BREATH: 0
WHEEZING: 0
SORE THROAT: 0
ARTHRALGIAS: 0

## 2025-06-06 NOTE — PROGRESS NOTES
Reason for Appointment  Chief Complaint   Patient presents with    Left Shoulder - Follow-up    Right Shoulder - Follow-up     History of Present Illness  Patient is a 45 y.o. male here today for a [facility] case follow-up evaluation of improved bilateral shoulder pain. He has recently been let go from his previous job and since not having to do the heavy repetitive job his shoulders are doing well. The most recent injection into the left shoulder is still working, having minimal shoulder pain today. He would like to find a new job that does not entail heavy lifting and repetitive activity. No other changes in his PMH, allergies, or medications     Medical History[1]    Surgical History[2]    Medication Documentation Review Audit       Reviewed by Kalpana Kang MA (Medical Assistant) on 06/05/25 at 1338      Medication Order Taking? Sig Documenting Provider Last Dose Status   amphetamine-dextroamphetamine (Adderall) 15 mg tablet [id number] Yes Take 1 tablet (15 mg) by mouth every 12 hours. Historical Provider, MD Taking Active   methylPREDNISolone acetate (DEPO-Medrol) injection 80 mg [id number]   Feliciano Echeverria MD  Active   naproxen sodium (Aleve) 220 mg tablet [id number] Yes Take by mouth every 12 hours if needed. Historical Provider, MD Taking Active   tiZANidine (Zanaflex) 4 mg tablet [id number] Yes Take 1 tablet (4 mg) by mouth as needed at bedtime. Historical Provider, MD Taking Active   triamcinol ac (PF) in 0.9%NaCl (KENALOG) injection 40 mg [id number]   Felicinao Echeverria MD  Active                    RX Allergies[3]    Review of Systems   Constitutional:  Negative for chills, fatigue and fever.   HENT:  Negative for nosebleeds, sinus pain and sore throat.    Respiratory:  Negative for shortness of breath and wheezing.    Cardiovascular:  Negative for chest pain and leg swelling.   Musculoskeletal:  Negative for arthralgias and joint swelling.   Skin:  Negative for color change and pallor.     Exam   On exam  bilateral shoulders show good active forward flexion up to 150 degrees. He has excellent cuff strength with resisted external rotation and no severe biceps tenderness today on the left. Deltoids are functional bilaterally. Good pulses and sensation in the upper extremity    Assessment   Bilateral biceps tendinitis    Plan   He is doing very well as he currently is not working. We will allow him to return to work full duty as he looks for a new job and hopefully will not require heavy lifting and repetitive activity to protect the shoulders. He can follow up with us in 6 months unless he has any new issues    I, Merline Weaver PA-C, am acting as a scribe and attest that this documentation has been prepared under the direction and in the presence of Feliciano Echeverria MD.     By signing below, I, Feliciano Echeverria MD, personally performed the services described in this documentation. All medical record entries made by the scribe were at my direction and in my presence. I have reviewed the chart and agree that the record reflects my personal performance and is accurate and complete.                     [1]   Past Medical History:  Diagnosis Date    ADHD    [2]   Past Surgical History:  Procedure Laterality Date    NO PAST SURGERIES      WISDOM TOOTH EXTRACTION Bilateral    [3] No Known Allergies

## (undated) DEVICE — DRESSING, GAUZE, SPONGE, KERLIX, SUPER, 6 X 6.75 IN, STERILE 10PK

## (undated) DEVICE — GLOVE, SURGICAL, PROTEXIS PI BLUE W/NEUTHERA, 6.5, PF, LF

## (undated) DEVICE — GLOVE, PROTEXIS PI CLASSIC, SZ-6.5, PF, LF

## (undated) DEVICE — DRESSING, TRANSPARENT, TEGADERM, FRAME STYLE, 4 X 4.5, STRL

## (undated) DEVICE — Device

## (undated) DEVICE — SUTURE, PROLENE, 3-0, 18 IN, PS2, BLUE

## (undated) DEVICE — GLOVE, SURGICAL, PROTEXIS PI , 7.5, PF, LF

## (undated) DEVICE — TUBING, SUCTION, 6MM X 10, CLEAN N-COND

## (undated) DEVICE — NEEDLE, ELECTRODE, ELECTROSURGICAL, INSULATED

## (undated) DEVICE — GOWN, SURGICAL, SIRUS, NON REINFORCED, LARGE

## (undated) DEVICE — DRESSING, NON-ADHERENT, 3 X 3 IN, STERILE

## (undated) DEVICE — DRESSING, ABDOMINAL, TENDERSORB, 8 X 7-1/2 IN, STERILE

## (undated) DEVICE — BLANKET, LOWER BODY, VHA PLUS, ADULT

## (undated) DEVICE — PROBE, APOLLO RF, 90 DEG, EXTRA LARTGE

## (undated) DEVICE — TUBING, PUMP MAIN 16FT STERILE

## (undated) DEVICE — BANDAGE, ELASTIC, MATRIX, SELF-CLOSURE, 6 IN X 5 YD, LF

## (undated) DEVICE — EXCAL 4MM X 13CM SINGLE